# Patient Record
Sex: FEMALE | Race: WHITE | NOT HISPANIC OR LATINO | Employment: UNEMPLOYED | URBAN - METROPOLITAN AREA
[De-identification: names, ages, dates, MRNs, and addresses within clinical notes are randomized per-mention and may not be internally consistent; named-entity substitution may affect disease eponyms.]

---

## 2023-06-26 ENCOUNTER — TELEPHONE (OUTPATIENT)
Dept: OBSTETRICS AND GYNECOLOGY | Facility: CLINIC | Age: 28
End: 2023-06-26
Payer: COMMERCIAL

## 2023-06-26 ENCOUNTER — TELEPHONE (OUTPATIENT)
Dept: OBSTETRICS AND GYNECOLOGY | Facility: CLINIC | Age: 28
End: 2023-06-26

## 2023-06-26 NOTE — TELEPHONE ENCOUNTER
Phone call returned  No answer   Voicemail has not been setup to leave a message.   I will attempt to reach at a later time.

## 2023-06-26 NOTE — TELEPHONE ENCOUNTER
Phone message returned, pt requesting new pregnancy appt. Scheduled and pt acknowledged understanding.

## 2023-06-26 NOTE — TELEPHONE ENCOUNTER
----- Message from Stacey Gauthier MA sent at 6/26/2023  9:44 AM CDT -----  Regarding: FW: pt advice  Contact: pt    ----- Message -----  From: Yanique Vallejo  Sent: 6/26/2023   9:40 AM CDT  To: Eleni Turcios (Obgyn) Staff, #  Subject: pt advice                                        Pt is calling to speak to nurse. Pt has some questions regarding provider. States the she is pregnant and looking for a provider.  Please call back at [phone]//thank you acc

## 2023-07-05 DIAGNOSIS — N91.2 AMENORRHEA: Primary | ICD-10-CM

## 2023-07-14 ENCOUNTER — OFFICE VISIT (OUTPATIENT)
Dept: OBSTETRICS AND GYNECOLOGY | Facility: CLINIC | Age: 28
End: 2023-07-14
Payer: COMMERCIAL

## 2023-07-14 ENCOUNTER — PROCEDURE VISIT (OUTPATIENT)
Dept: OBSTETRICS AND GYNECOLOGY | Facility: CLINIC | Age: 28
End: 2023-07-14
Payer: COMMERCIAL

## 2023-07-14 VITALS
DIASTOLIC BLOOD PRESSURE: 68 MMHG | HEIGHT: 66 IN | WEIGHT: 170 LBS | BODY MASS INDEX: 27.32 KG/M2 | SYSTOLIC BLOOD PRESSURE: 134 MMHG | HEART RATE: 92 BPM

## 2023-07-14 DIAGNOSIS — Z11.3 SCREENING EXAMINATION FOR VENEREAL DISEASE: Primary | ICD-10-CM

## 2023-07-14 DIAGNOSIS — Z11.9 SCREENING EXAMINATION FOR UNSPECIFIED INFECTIOUS DISEASE: ICD-10-CM

## 2023-07-14 DIAGNOSIS — N91.2 AMENORRHEA: ICD-10-CM

## 2023-07-14 DIAGNOSIS — Z34.90 PREGNANCY, UNSPECIFIED GESTATIONAL AGE: ICD-10-CM

## 2023-07-14 PROCEDURE — 3078F PR MOST RECENT DIASTOLIC BLOOD PRESSURE < 80 MM HG: ICD-10-PCS | Mod: CPTII,S$GLB,, | Performed by: OBSTETRICS & GYNECOLOGY

## 2023-07-14 PROCEDURE — 3078F DIAST BP <80 MM HG: CPT | Mod: CPTII,S$GLB,, | Performed by: OBSTETRICS & GYNECOLOGY

## 2023-07-14 PROCEDURE — 76801 OB US < 14 WKS SINGLE FETUS: CPT | Mod: S$GLB,,, | Performed by: OBSTETRICS & GYNECOLOGY

## 2023-07-14 PROCEDURE — 0500F INITIAL PRENATAL CARE VISIT: CPT | Mod: CPTII,S$GLB,, | Performed by: OBSTETRICS & GYNECOLOGY

## 2023-07-14 PROCEDURE — 3008F PR BODY MASS INDEX (BMI) DOCUMENTED: ICD-10-PCS | Mod: CPTII,S$GLB,, | Performed by: OBSTETRICS & GYNECOLOGY

## 2023-07-14 PROCEDURE — 1159F MED LIST DOCD IN RCRD: CPT | Mod: CPTII,S$GLB,, | Performed by: OBSTETRICS & GYNECOLOGY

## 2023-07-14 PROCEDURE — 3075F PR MOST RECENT SYSTOLIC BLOOD PRESS GE 130-139MM HG: ICD-10-PCS | Mod: CPTII,S$GLB,, | Performed by: OBSTETRICS & GYNECOLOGY

## 2023-07-14 PROCEDURE — 0500F PR INITIAL PRENATAL CARE VISIT: ICD-10-PCS | Mod: CPTII,S$GLB,, | Performed by: OBSTETRICS & GYNECOLOGY

## 2023-07-14 PROCEDURE — 1159F PR MEDICATION LIST DOCUMENTED IN MEDICAL RECORD: ICD-10-PCS | Mod: CPTII,S$GLB,, | Performed by: OBSTETRICS & GYNECOLOGY

## 2023-07-14 PROCEDURE — 3008F BODY MASS INDEX DOCD: CPT | Mod: CPTII,S$GLB,, | Performed by: OBSTETRICS & GYNECOLOGY

## 2023-07-14 PROCEDURE — 76801 US OB/GYN PROCEDURE (VIEWPOINT): ICD-10-PCS | Mod: S$GLB,,, | Performed by: OBSTETRICS & GYNECOLOGY

## 2023-07-14 PROCEDURE — 3075F SYST BP GE 130 - 139MM HG: CPT | Mod: CPTII,S$GLB,, | Performed by: OBSTETRICS & GYNECOLOGY

## 2023-07-17 LAB
CHLAMYDIA: NEGATIVE
GONORRHEA: NEGATIVE
SOURCE: NORMAL
SOURCE: NORMAL
TRICHOMONAS AMPLIFIED: NEGATIVE

## 2023-10-23 ENCOUNTER — TELEPHONE (OUTPATIENT)
Dept: OBSTETRICS AND GYNECOLOGY | Facility: CLINIC | Age: 28
End: 2023-10-23
Payer: COMMERCIAL

## 2023-10-23 NOTE — TELEPHONE ENCOUNTER
Spoke with patient about [facility] leaving. She already founf another OB in [place] and would like to be removed from [facility]'s patients list.

## 2023-11-09 ENCOUNTER — APPOINTMENT (OUTPATIENT)
Dept: LAB | Facility: HOSPITAL | Age: 28
End: 2023-11-09
Attending: MIDWIFE
Payer: COMMERCIAL

## 2023-11-09 DIAGNOSIS — O24.410 GESTATIONAL DIABETES MELLITUS, CLASS A1: Primary | ICD-10-CM

## 2023-11-09 LAB
GLUCOSE 1H P 100 G GLC PO SERPL-MCNC: 164 MG/DL (ref 100–180)
GLUCOSE 2H P 100 G GLC PO SERPL-MCNC: 167 MG/DL (ref 70–140)
GLUCOSE 3H P 100 G GLC PO SERPL-MCNC: 138 MG/DL (ref 70–115)
GLUCOSE P FAST SERPL-MCNC: 99 MG/DL (ref 70–100)
PATH REV: NORMAL
POCT GLUCOSE: 96 MG/DL (ref 70–110)

## 2023-11-09 PROCEDURE — 82951 GLUCOSE TOLERANCE TEST (GTT): CPT

## 2023-11-09 PROCEDURE — 82950 GLUCOSE TEST: CPT

## 2023-11-09 PROCEDURE — 36415 COLL VENOUS BLD VENIPUNCTURE: CPT

## 2023-11-09 PROCEDURE — 82947 ASSAY GLUCOSE BLOOD QUANT: CPT

## 2023-11-09 PROCEDURE — 82952 GTT-ADDED SAMPLES: CPT

## 2023-11-23 ENCOUNTER — OFFICE VISIT (OUTPATIENT)
Dept: URGENT CARE | Facility: CLINIC | Age: 28
End: 2023-11-23
Payer: COMMERCIAL

## 2023-11-23 VITALS
TEMPERATURE: 98 F | SYSTOLIC BLOOD PRESSURE: 126 MMHG | HEIGHT: 66 IN | DIASTOLIC BLOOD PRESSURE: 68 MMHG | BODY MASS INDEX: 27.97 KG/M2 | WEIGHT: 174 LBS | OXYGEN SATURATION: 97 % | HEART RATE: 92 BPM | RESPIRATION RATE: 20 BRPM

## 2023-11-23 DIAGNOSIS — Z34.90 PREGNANCY, UNSPECIFIED GESTATIONAL AGE: ICD-10-CM

## 2023-11-23 DIAGNOSIS — O47.9 BRAXTON HICK'S CONTRACTION: ICD-10-CM

## 2023-11-23 DIAGNOSIS — R35.0 FREQUENCY OF URINATION: Primary | ICD-10-CM

## 2023-11-23 LAB
BILIRUB UR QL STRIP: NEGATIVE
GLUCOSE UR QL STRIP: NEGATIVE
KETONES UR QL STRIP: NEGATIVE
LEUKOCYTE ESTERASE UR QL STRIP: NEGATIVE
PH, POC UA: 7
POC BLOOD, URINE: NEGATIVE
POC NITRATES, URINE: NEGATIVE
PROT UR QL STRIP: NEGATIVE
SP GR UR STRIP: 1.01 (ref 1–1.03)
UROBILINOGEN UR STRIP-ACNC: NORMAL (ref 0.1–1.1)

## 2023-11-23 PROCEDURE — 99203 PR OFFICE/OUTPT VISIT, NEW, LEVL III, 30-44 MIN: ICD-10-PCS | Mod: S$GLB,,, | Performed by: NURSE PRACTITIONER

## 2023-11-23 PROCEDURE — 81003 URINALYSIS AUTO W/O SCOPE: CPT | Mod: QW,S$GLB,, | Performed by: NURSE PRACTITIONER

## 2023-11-23 PROCEDURE — 99203 OFFICE O/P NEW LOW 30 MIN: CPT | Mod: S$GLB,,, | Performed by: NURSE PRACTITIONER

## 2023-11-23 PROCEDURE — 81003 POCT URINALYSIS, DIPSTICK, AUTOMATED, W/O SCOPE: ICD-10-PCS | Mod: QW,S$GLB,, | Performed by: NURSE PRACTITIONER

## 2023-11-23 NOTE — PROGRESS NOTES
"Subjective:      Patient ID: Corinne Haley is a 28 y.o. female.    Vitals:  height is 5' 6" (1.676 m) and weight is 78.9 kg (174 lb). Her oral temperature is 98 °F (36.7 °C). Her blood pressure is 126/68 and her pulse is 92. Her respiration is 20 and oxygen saturation is 97%.     Chief Complaint: Dysuria    Patient is a 27 yo female, , 30wks gestation who presents for evaluation of frequent urination and pelvic cramping. Onset yesterday. She states she had a few contractions last night, not at regular intervals. She reports she then fell asleep and notes one contraction this morning. She reports she had intercourse Tuesday prior to onset of symptoms. She reports consistently feeling fetal movement. Her prenatal care is UTD. She denies fever, nausea, vomiting, back pain, vaginal bleeding/discharge or leakage of fluids. No other concerns were voiced.     Dysuria   This is a new problem. The current episode started acute onset. The problem has been unchanged. The quality of the pain is described as burning and aching. The pain is at a severity of 6/10. The pain is moderate. There has been no fever. She is Sexually active. There is No history of pyelonephritis. Associated symptoms include frequency and urgency. Pertinent negatives include no behavior changes, chills, discharge, flank pain, hematuria, hesitancy, nausea, possible pregnancy, sweats, vomiting, weight loss, bubble bath use, constipation, rash or withholding. She has tried nothing for the symptoms. The treatment provided no relief. There is no history of catheterization, diabetes insipidus, diabetes mellitus, genitourinary reflux, hypertension, kidney stones, recurrent UTIs, a single kidney, STD, urinary stasis or a urological procedure.       Constitution: Negative for chills and fever.   Gastrointestinal:  Negative for abdominal pain, nausea, vomiting, constipation, diarrhea and rectal pain.   Genitourinary:  Positive for frequency, urgency and painful " intercourse. Negative for dysuria, flank pain, bladder incontinence, bed wetting, hematuria, vaginal pain, vaginal discharge and vaginal bleeding.   Skin:  Negative for rash.      Objective:     Physical Exam   Constitutional: She is oriented to person, place, and time. She appears well-developed. She is cooperative.   HENT:   Head: Normocephalic and atraumatic.   Ears:   Right Ear: Hearing and external ear normal.   Left Ear: Hearing and external ear normal.   Nose: Nose normal. No mucosal edema or nasal deformity. No epistaxis. Right sinus exhibits no maxillary sinus tenderness and no frontal sinus tenderness. Left sinus exhibits no maxillary sinus tenderness and no frontal sinus tenderness.   Mouth/Throat: Uvula is midline, oropharynx is clear and moist and mucous membranes are normal. Mucous membranes are moist. No trismus in the jaw. Normal dentition. No uvula swelling. Oropharynx is clear.   Eyes: Conjunctivae and lids are normal.   Neck: Trachea normal and phonation normal. Neck supple.   Cardiovascular: Normal rate, regular rhythm, normal heart sounds and normal pulses.   Pulmonary/Chest: Effort normal and breath sounds normal. She has no wheezes. She has no rales.   Abdominal: Normal appearance and bowel sounds are normal. Soft. There is no abdominal tenderness. There is no rebound and no guarding.   Musculoskeletal: Normal range of motion.         General: Normal range of motion.   Neurological: She is alert and oriented to person, place, and time. She exhibits normal muscle tone.   Skin: Skin is warm, dry and intact.   Psychiatric: Her speech is normal and behavior is normal. Judgment and thought content normal.   Nursing note and vitals reviewed.      Assessment:     1. Frequency of urination    2. Adrian Hick's contraction        Plan:       Frequency of urination  -     POCT Urinalysis, Dipstick, Automated, W/O Scope    Adrian Hick's contraction          Medical Decision Making:   Initial Assessment:  "  Nontoxic appearing 27 yo female c/o urinary urgency and cramping in pregnancy.  Following complete history and physical my clinical impression is Lyle Lucas contractions.  Patient is a  30 week gestation, reporting less than 5 "contractions" that were irregular in interval.  Low concern for early delivery/premature labor due to clinical evaluation.  Considered preeclampsia, patient's vital signs are normal and there is no protein in urine.  Also consider placenta previa, HELLP syndrome both unlikely based upon exam and urine results. Provided with follow up and ER precautions   Clinical Tests:   Lab Tests: Reviewed  The following lab test(s) were unremarkable: Urinalysis       Results for orders placed or performed in visit on 23   POCT Urinalysis, Dipstick, Automated, W/O Scope   Result Value Ref Range    POC Blood, Urine Negative Negative    POC Bilirubin, Urine Negative Negative    POC Urobilinogen, Urine norm 0.1 - 1.1    POC Ketones, Urine Negative Negative    POC Protein, Urine Negative Negative    POC Nitrates, Urine Negative Negative    POC Glucose, Urine Negative Negative    pH, UA 7.0     POC Specific Gravity, Urine 1.015 1.003 - 1.029    POC Leukocytes, Urine Negative Negative     Patient Instructions   Drink plenty of water.     Monitor for any signs of labor. If you develop contractions at regular intervals, severe pain, fever, vaginal bleeding, leakage of fluid or other concerning symptoms go to the ER for treatment immediately.       "

## 2023-11-23 NOTE — PATIENT INSTRUCTIONS
Drink plenty of water.     Monitor for any signs of labor. If you develop contractions at regular intervals, severe pain, fever, vaginal bleeding, leakage of fluid or other concerning symptoms go to the ER for treatment immediately.

## 2023-11-25 ENCOUNTER — TELEPHONE (OUTPATIENT)
Dept: URGENT CARE | Facility: CLINIC | Age: 28
End: 2023-11-25
Payer: COMMERCIAL

## 2024-01-03 DIAGNOSIS — O24.410 GESTATIONAL DIABETES MELLITUS IN PREGNANCY, DIET CONTROLLED: ICD-10-CM

## 2024-01-03 DIAGNOSIS — Z36.89 ENCOUNTER FOR FETAL ANATOMIC SURVEY: Primary | ICD-10-CM

## 2024-01-04 ENCOUNTER — PROCEDURE VISIT (OUTPATIENT)
Dept: MATERNAL FETAL MEDICINE | Facility: CLINIC | Age: 29
End: 2024-01-04
Payer: COMMERCIAL

## 2024-01-04 ENCOUNTER — OFFICE VISIT (OUTPATIENT)
Dept: MATERNAL FETAL MEDICINE | Facility: CLINIC | Age: 29
End: 2024-01-04
Payer: COMMERCIAL

## 2024-01-04 VITALS
HEART RATE: 82 BPM | DIASTOLIC BLOOD PRESSURE: 86 MMHG | WEIGHT: 192 LBS | HEIGHT: 66 IN | BODY MASS INDEX: 30.86 KG/M2 | SYSTOLIC BLOOD PRESSURE: 132 MMHG

## 2024-01-04 DIAGNOSIS — O24.410 GESTATIONAL DIABETES MELLITUS IN PREGNANCY, DIET CONTROLLED: ICD-10-CM

## 2024-01-04 DIAGNOSIS — E66.3 OVERWEIGHT (BMI 25.0-29.9): ICD-10-CM

## 2024-01-04 DIAGNOSIS — O34.13 LEIOMYOMA OF UTERUS AFFECTING PREGNANCY IN THIRD TRIMESTER: ICD-10-CM

## 2024-01-04 DIAGNOSIS — D25.9 LEIOMYOMA OF UTERUS AFFECTING PREGNANCY IN THIRD TRIMESTER: ICD-10-CM

## 2024-01-04 DIAGNOSIS — Z36.89 ENCOUNTER FOR FETAL ANATOMIC SURVEY: ICD-10-CM

## 2024-01-04 DIAGNOSIS — O24.410 DIET CONTROLLED GESTATIONAL DIABETES MELLITUS (GDM) IN THIRD TRIMESTER: ICD-10-CM

## 2024-01-04 DIAGNOSIS — O36.5930 POOR FETAL GROWTH AFFECTING MANAGEMENT OF MOTHER IN THIRD TRIMESTER, SINGLE OR UNSPECIFIED FETUS: ICD-10-CM

## 2024-01-04 DIAGNOSIS — O24.415 GESTATIONAL DIABETES MELLITUS (GDM) IN THIRD TRIMESTER CONTROLLED ON ORAL HYPOGLYCEMIC DRUG: Primary | ICD-10-CM

## 2024-01-04 PROBLEM — O24.414 INSULIN CONTROLLED GESTATIONAL DIABETES MELLITUS (GDM) IN THIRD TRIMESTER: Status: ACTIVE | Noted: 2024-01-04

## 2024-01-04 PROCEDURE — 1159F MED LIST DOCD IN RCRD: CPT | Mod: CPTII,S$GLB,, | Performed by: OBSTETRICS & GYNECOLOGY

## 2024-01-04 PROCEDURE — 3079F DIAST BP 80-89 MM HG: CPT | Mod: CPTII,S$GLB,, | Performed by: OBSTETRICS & GYNECOLOGY

## 2024-01-04 PROCEDURE — 76820 UMBILICAL ARTERY ECHO: CPT | Mod: S$GLB,,, | Performed by: OBSTETRICS & GYNECOLOGY

## 2024-01-04 PROCEDURE — 76819 FETAL BIOPHYS PROFIL W/O NST: CPT | Mod: S$GLB,,, | Performed by: OBSTETRICS & GYNECOLOGY

## 2024-01-04 PROCEDURE — 99204 OFFICE O/P NEW MOD 45 MIN: CPT | Mod: S$GLB,,, | Performed by: OBSTETRICS & GYNECOLOGY

## 2024-01-04 PROCEDURE — 76811 OB US DETAILED SNGL FETUS: CPT | Mod: S$GLB,,, | Performed by: OBSTETRICS & GYNECOLOGY

## 2024-01-04 PROCEDURE — 3008F BODY MASS INDEX DOCD: CPT | Mod: CPTII,S$GLB,, | Performed by: OBSTETRICS & GYNECOLOGY

## 2024-01-04 PROCEDURE — 3075F SYST BP GE 130 - 139MM HG: CPT | Mod: CPTII,S$GLB,, | Performed by: OBSTETRICS & GYNECOLOGY

## 2024-01-04 NOTE — PROGRESS NOTES
Maternal Fetal Medicine Consult    Subjective     Patient ID: [id number]    Chief Complaint: MFM consult with US (GDM diet controlled.  Patient is having headaches.)      HPI: 28 y.o.  female  at 36w0d gestation with Estimated Date of Delivery: 24 by early US and unknown LMP. She is sent for MFM consultation for GDM.     She has gestational diabetes and is currently on metformin 1000 mg with dinner.  She has family history of diabetes in a maternal grandfather.  She had increased BMI over 25 earlier in the pregnancy.  Patient reported that she has known history of fibroid.  She has had no problems from it during pregnancy.       She denies any personal or family history of aneuploidy or anomalies. She denies any exposure to high fevers, viral rashes, illicit drugs or alcohol in this pregnancy.  She denies any leaking fluid, vaginal bleeding, contractions, decreased fetal movement. Denies headaches, visual disturbances, or epigastric pain.       Pregnancy complications include:  Patient Active Problem List   Diagnosis    Gestational diabetes mellitus (GDM) in third trimester controlled on oral hypoglycemic drug    Poor fetal growth affecting management of mother in third trimester    (BMI 25.0-29.9) earlier in pregnancy    Leiomyoma of uterus affecting pregnancy in third trimester        Past Medical History:   Diagnosis Date    GDM (gestational diabetes mellitus) 2023    Thyroid nodule        Past Surgical History:   Procedure Laterality Date    TONSILLECTOMY  2022       Family History   Problem Relation Age of Onset    Melanoma Paternal Grandfather     Lung cancer Paternal Grandfather     Hypertension Maternal Grandmother     Obesity Maternal Grandmother     Thyroid disease Maternal Grandmother     Stroke Maternal Grandfather     Hypertension Maternal Grandfather     Diabetes Maternal Grandfather     Cancer Father     Melanoma Father     Asthma Mother     Thyroid disease Mother     Hypertension  "Mother     Gallbladder disease Mother     Asthma Sister        Social History     Socioeconomic History    Marital status:    Tobacco Use    Smoking status: Never    Smokeless tobacco: Never   Substance and Sexual Activity    Alcohol use: Never    Drug use: Never    Sexual activity: Yes     Partners: Male       Current Outpatient Medications   Medication Sig Dispense Refill    metFORMIN (GLUCOPHAGE-XR) 500 MG ER 24hr tablet Take 2 tablets (1,000 mg total) by mouth Daily. 60 tablet 3     No current facility-administered medications for this visit.       Review of patient's allergies indicates:   Allergen Reactions    Sulfa (sulfonamide antibiotics)        Medications:  Current Outpatient Medications   Medication Instructions    metFORMIN (GLUCOPHAGE-XR) 1,000 mg, Oral, Daily       Review of Systems   12 point review of systems conducted, negative except as stated in the history of present illness. See HPI for details.      Objective     Visit Vitals  /86 (BP Location: Left arm, Patient Position: Sitting, BP Method: Large (Automatic))   Pulse 82   Ht 5' 6" (1.676 m)   Wt 87.1 kg (192 lb)   LMP 04/29/2023 (Approximate)   BMI 30.99 kg/m²        Physical Exam  Vitals and nursing note reviewed.   Constitutional:       General: She is not in acute distress.     Appearance: Normal appearance.   HENT:      Head: Normocephalic and atraumatic.      Nose: Nose normal. No congestion.      Mouth/Throat:      Pharynx: Oropharynx is clear.   Eyes:      General: No scleral icterus.     Pupils: Pupils are equal, round, and reactive to light.   Cardiovascular:      Rate and Rhythm: Normal rate and regular rhythm.   Pulmonary:      Effort: No respiratory distress.      Breath sounds: Normal breath sounds. No wheezing.   Abdominal:      General: Abdomen is flat.      Palpations: Abdomen is soft.      Tenderness: There is no abdominal tenderness. There is no right CVA tenderness, left CVA tenderness or guarding.      " Comments: No CVA tenderness gravid uterus.    Musculoskeletal:         General: Normal range of motion.      Cervical back: Neck supple.      Right lower leg: No edema.      Left lower leg: No edema.   Skin:     General: Skin is warm.      Findings: No bruising or rash.   Neurological:      General: No focal deficit present.      Mental Status: She is oriented to person, place, and time.      Deep Tendon Reflexes: Reflexes normal.      Comments: Normal reflexes   Psychiatric:         Mood and Affect: Mood normal.         Behavior: Behavior normal.         Thought Content: Thought content normal.         Judgment: Judgment normal.         ASSESSMENT/PLAN:     28 y.o.  female with IUP at 36w0d    Gestational diabetes mellitus  There is mild fetal growth restriction with an EFW of 2179 g at the 5% and the AC at the 3% on 2024.  AFV is low normal.  BPP is 8/8.  Normal umbilical artery Doppler.    The incidence of diabetes in pregnancy is 6-7%, and about 85% represent GDM. I discussed with her risks associated with diabetes in pregnancy including higher risk for polyhydramnios, fetal macrosomia, lower Apgar scores and  metabolic complications (hypoglycemia, hyperbilirubinemia, hypocalcemia, erythema) and developing preeclampsia. With suboptimal diet control, treatment is recommended in addition to 2200 calorie diabetic diet. It is recommended that she have 30 grams of carbohydrates with breakfast, and 60 grams with lunch and dinner. In addition, she should have three snacks in between meals with 15 grams of carbohydrates and at bedtime with 30 grams of carbohydrates. The importance of avoiding any significant weight gain till the end of the pregnancy was discussed.      I have shared with her the options of treatment including insulin treatment which is the gold standard of care for diabetes in pregnancy versus a recent use of glyburide or metformin as alternatives. Although, glyburide has been used over  the past 10 years as primary alternative to insulin, a recent meta-analysis (2015) suggested that metformin should be the alternative to insulin and not glyburide. The conclusion of the study showed a higher birth weight (100 g) associated with glyburide use compared to insulin, two fold higher risk of  hypoglycemia, and more than 2x higher risk of macrosomia associated with glyburide use. This difference is likely to be secondary to hyperinsulinism in fetus secondary to fetal exposure to glyburide.  Metformin, also had lower maternal weight gain, lower birth rate and less risk of macrosomia when compared with glyburide. When compared to insulin, Metformin had lower maternal weight gain, increased  birth and lower gestational age at delivery and lower incidence of gestational hypertension. There was a trend for less  hypoglycemia and high failure rate of 30-35%, when compared to insulin.  In addition, the lack of long term data on glyburide and metformin use regarding safety was addressed and recommended use of Insulin for treatment, which is the gold standard, with excellent efficiency and safety, albeit more inconvenience.  Questions were answered.                  Log reviewed. After counseling on Insulin use, side effects and administration, she agreed to adjust metformin to 500 mg in the with breakfast and 1000 mg with supper. With insulin use/metformin, there is risk for hypoglycemia. I discussed symptoms of hypoglycemia with recommendation to assess blood sugar then eat something high in sugar. I informed her that the best way to decrease episodes of low blood sugar is well balanced healthy diabetic diet with appropriate snacks between meals. Brochures given. Questions answered.       The patient was instructed to check blood sugar four times per day and call me in a few days with her blood sugars to make further adjustments of dose of Insulin. The goal of treatment is to keep  pre-prandial blood sugars below 90 and one hour postprandial below 140.     Advised her to do fetal kick counts throughout the pregnancy.    The association of gestational diabetes (50%) with adult-onset type 2 diabetes mellitus was previously discussed.  The importance of losing weight after the pregnancy was emphasized, as well as doing a 75 g sugar test after postpartum exam and to have annual checkups every 1-3 years for blood sugars to make sure she does not develop diabetes.        Mild fetal growth restriction  I discussed potential etiologies of FGR include but are not limited to normal variation of stature, placental insufficiency, chromosomal abnormalities, genetic disorders, infections, medical conditions, teratogen exposure and other etiologies.  We discussed the increased risk of stillbirth and the potential need for earlier delivery.The potential for constitutional factor as a contributing factor was addressed in addition to other contributing factors such as conditions predisposing to vascular disease such as pregestational diabetes, chronic renal disease, autoimmune disorders; umbilical cord abnormalities; substance use; and multiple gestation. Although uteroplacental insufficiency and/or constitutional factors (if relevant) are the likely etiology, the potential for anomalies not seen with the ultrasound, aneuploidy, or in-utero viral infections are there, but these are very unlikely to be the cause with no other ultrasound findings. Based on a study in 2018, there is an abnormal microarray in 3% of isolated FGR. I discussed and offered genetic amniocentesis, to check for microarray and CMV was offered. The benefits and risks were discussed. She declined amniocentesis . She was advised of need for  evaluation.    She was counseled on Cell free DNA understanding that it is an enhanced screening test but not a diagnostic test. It assesses risk for common aneuploidies such as Trisomy 13, 18,  and 21 by evaluating cell-free fetal DNA in maternal circulation with a false positive rate less than 0.5% for Trisomy 21 and less reliable for Trisomy 13 and Trisomy 18. She declined cfDNA .  Being so close to delivery.    Complications of growth-restricted neonates include hypoglycemia, hyperbilirubinemia, hypothermia, seizures, sepsis, IVH, RDS, necrotizing enterocolitis, and  asphyxia. Long-term complications include cognitive delay and adult diseases such as cardiovascular disorders and type 2 diabetes. There is an increased risk (~20%) for recurrence of fetal growth restriction in a future pregnancy.    Because of increased risk of stillbirth, she was advised that in this situation we need to do twice weekly fetal testing, including an NST at least once per week. She was instructed that fetal testing is not a 100% protective against the risk of fetal demise in-utero. The importance of doing fetal kick counts three times a day and resting in a lateral recumbent position and reporting immediately at any time there is any decreased fetal movement even if the same day of testing was emphasized. Her questions were answered.    Delivery is not indicated at this time, as risks of  mortality and morbidity with delivery, outweigh risks with continued pregnancy. Likewise, with stable condition, or too early a gestational age, steroids (and magnesium sulfate) are not recommended, as benefit is reaped only if suspected imminent delivery in few days which, although possible, is very unlikely at this time. Plan delivery 38  weeks if continued fetal growth and reassuring fetal testing.  Earlier delivery may be needed if worsening fetal growth, abnormal doppler, or abnormal fetal testing or other concerns.       Leiomyoma in pregnancy  There is an anterior uterine fibroid seen measuring 2.3 cm on 2024.    I counseled the patient regarding fibroids in pregnancy. She was counseled on the risk for   delivery,  PROM, abnormal presentations, higher risk of  sections, placenta previa, fibroid degeneration, poor fetal growth, oligohydramnios, vaginal bleeding and postpartum hemorrhage and in rare circumstances obstruction of lower uterus/cervix, impeding vaginal delivery. Although it was previously suggested that myomas are associated with spontaneous miscarriages, a recent review from 2017, showed no increased risk of spontaneous miscarriages in an analysis of 20,000 pregnant women with fibroids.Recent data shows potential association with increase stillbirth risk. The size of myomas, number and position behind placenta may be important factors in that risk. There is no prevention available and expectant management needs to be done.     Myomectomy is generally avoided in pregnancy due to concerns regarding pregnancy complications.    She was instructed to report any increased pain, cramps, vaginal discharge or bleeding. Emphasized the importance of monitoring fetal kick count activity, and reporting immediately if decreased fetal movement occurs.      Increased BMI in pregnancy  Body mass index is 30.99 kg/m².    She was advised of the importance of eating healthy and and limiting weight gain to 15-25lbs during the pregnancy, as optimal in this situation. Discussed the importance of losing weight after this pregnancy, especially before attempting future pregnancy. Breastfeeding may be an important tool in reducing the postpartum weight retention. Excess weight gain would be associated with gestational hypertension, gestational diabetes and adverse  outcomes, including fetal demise in utero.    Patient was counseled on significance of gestational diabetes for both mother and fetus and long-term implications.  Sugars are not optimally controlled.  Reviewed the option of insulin versus adjusting metformin agreed to do the latter with patient to take 500 mg of metformin the morning and 1000 mg  with supper.  Will plan to see the patient on Thursday and will pay after patient go tomorrow with Dr. Hernandez's office to check amniotic fluid volume and recheck again in 4 days.  Increase oral hydration advice discussed.  Patient was counseled on fetal growth restriction and agreed to avoid any additional genetic testing being so close to delivery.  Fibroid concerns discussed. Patient's questions were answered.  She is in agreement with plan of care.          Follow up in about 1 week (around 1/11/2024) for MFM follow-up, BPP, UAD.     Future Appointments   Date Time Provider Department Center   1/10/2024  1:30 PM Taylor Tavarez PA [facility]   4/29/2024 11:00 AM Taylor Tavarez PA [facility]        Patient was evaluated and examined by Dr. Willams. ADRIAN Hicks, helped in pre charting of part of note.    This note was created with the assistance of [employer] voice recognition software. There may be transcription errors as a result of using this technology, however minimal. Effort has been made to ensure accuracy of transcription, but any obvious errors or omissions should be clarified with the author of the document.

## 2024-01-05 ENCOUNTER — HOSPITAL ENCOUNTER (OUTPATIENT)
Facility: HOSPITAL | Age: 29
Discharge: HOME | End: 2024-01-05
Attending: OBSTETRICS & GYNECOLOGY | Admitting: OBSTETRICS & GYNECOLOGY
Payer: COMMERCIAL

## 2024-01-05 VITALS
RESPIRATION RATE: 18 BRPM | TEMPERATURE: 98 F | HEART RATE: 83 BPM | SYSTOLIC BLOOD PRESSURE: 134 MMHG | DIASTOLIC BLOOD PRESSURE: 86 MMHG

## 2024-01-05 DIAGNOSIS — O24.419 DM (DIABETES MELLITUS), GESTATIONAL: ICD-10-CM

## 2024-01-08 DIAGNOSIS — O36.5930 POOR FETAL GROWTH AFFECTING MANAGEMENT OF MOTHER IN THIRD TRIMESTER, SINGLE OR UNSPECIFIED FETUS: ICD-10-CM

## 2024-01-08 DIAGNOSIS — O24.410 GESTATIONAL DIABETES MELLITUS IN PREGNANCY, DIET CONTROLLED: Primary | ICD-10-CM

## 2024-01-11 ENCOUNTER — OFFICE VISIT (OUTPATIENT)
Dept: MATERNAL FETAL MEDICINE | Facility: CLINIC | Age: 29
End: 2024-01-11
Payer: COMMERCIAL

## 2024-01-11 ENCOUNTER — HOSPITAL ENCOUNTER (OUTPATIENT)
Facility: HOSPITAL | Age: 29
Discharge: HOME | End: 2024-01-11
Attending: OBSTETRICS & GYNECOLOGY | Admitting: OBSTETRICS & GYNECOLOGY
Payer: COMMERCIAL

## 2024-01-11 ENCOUNTER — PROCEDURE VISIT (OUTPATIENT)
Dept: MATERNAL FETAL MEDICINE | Facility: CLINIC | Age: 29
End: 2024-01-11
Payer: COMMERCIAL

## 2024-01-11 VITALS
HEART RATE: 91 BPM | SYSTOLIC BLOOD PRESSURE: 126 MMHG | WEIGHT: 192 LBS | HEIGHT: 66 IN | DIASTOLIC BLOOD PRESSURE: 95 MMHG | BODY MASS INDEX: 30.86 KG/M2

## 2024-01-11 VITALS
TEMPERATURE: 98 F | SYSTOLIC BLOOD PRESSURE: 123 MMHG | DIASTOLIC BLOOD PRESSURE: 72 MMHG | RESPIRATION RATE: 18 BRPM | HEART RATE: 91 BPM

## 2024-01-11 DIAGNOSIS — O34.13 LEIOMYOMA OF UTERUS AFFECTING PREGNANCY IN THIRD TRIMESTER: ICD-10-CM

## 2024-01-11 DIAGNOSIS — E66.3 OVERWEIGHT (BMI 25.0-29.9): ICD-10-CM

## 2024-01-11 DIAGNOSIS — O16.3 ELEVATED BLOOD PRESSURE AFFECTING PREGNANCY IN THIRD TRIMESTER, ANTEPARTUM: ICD-10-CM

## 2024-01-11 DIAGNOSIS — D25.9 LEIOMYOMA OF UTERUS AFFECTING PREGNANCY IN THIRD TRIMESTER: ICD-10-CM

## 2024-01-11 DIAGNOSIS — O36.5930 POOR FETAL GROWTH AFFECTING MANAGEMENT OF MOTHER IN THIRD TRIMESTER, SINGLE OR UNSPECIFIED FETUS: ICD-10-CM

## 2024-01-11 DIAGNOSIS — O24.410 GESTATIONAL DIABETES MELLITUS IN PREGNANCY, DIET CONTROLLED: ICD-10-CM

## 2024-01-11 DIAGNOSIS — O16.9 HYPERTENSION AFFECTING PREGNANCY: ICD-10-CM

## 2024-01-11 DIAGNOSIS — O24.415 GESTATIONAL DIABETES MELLITUS (GDM) IN THIRD TRIMESTER CONTROLLED ON ORAL HYPOGLYCEMIC DRUG: Primary | ICD-10-CM

## 2024-01-11 DIAGNOSIS — O24.410 GESTATIONAL DIABETES MELLITUS IN PREGNANCY, DIET CONTROLLED: Primary | ICD-10-CM

## 2024-01-11 LAB
ALBUMIN SERPL-MCNC: 3.2 G/DL (ref 3.5–5)
ALBUMIN/GLOB SERPL: 1.1 RATIO (ref 1.1–2)
ALP SERPL-CCNC: 132 UNIT/L (ref 40–150)
ALT SERPL-CCNC: 8 UNIT/L (ref 0–55)
AST SERPL-CCNC: 14 UNIT/L (ref 5–34)
BASOPHILS # BLD AUTO: 0.02 X10(3)/MCL
BASOPHILS NFR BLD AUTO: 0.2 %
BILIRUB SERPL-MCNC: 0.3 MG/DL
BUN SERPL-MCNC: 11.9 MG/DL (ref 7–18.7)
CALCIUM SERPL-MCNC: 9.2 MG/DL (ref 8.4–10.2)
CHLORIDE SERPL-SCNC: 108 MMOL/L (ref 98–107)
CO2 SERPL-SCNC: 21 MMOL/L (ref 22–29)
CREAT SERPL-MCNC: 0.61 MG/DL (ref 0.55–1.02)
EOSINOPHIL # BLD AUTO: 0.1 X10(3)/MCL (ref 0–0.9)
EOSINOPHIL NFR BLD AUTO: 1.1 %
ERYTHROCYTE [DISTWIDTH] IN BLOOD BY AUTOMATED COUNT: 13.2 % (ref 11.5–17)
GFR SERPLBLD CREATININE-BSD FMLA CKD-EPI: >60 MLS/MIN/1.73/M2
GLOBULIN SER-MCNC: 3 GM/DL (ref 2.4–3.5)
GLUCOSE SERPL-MCNC: 94 MG/DL (ref 74–100)
HCT VFR BLD AUTO: 37.5 % (ref 37–47)
HGB BLD-MCNC: 12.2 G/DL (ref 12–16)
IMM GRANULOCYTES # BLD AUTO: 0.08 X10(3)/MCL (ref 0–0.04)
IMM GRANULOCYTES NFR BLD AUTO: 0.9 %
LDH SERPL-CCNC: 163 U/L (ref 125–220)
LYMPHOCYTES # BLD AUTO: 1.46 X10(3)/MCL (ref 0.6–4.6)
LYMPHOCYTES NFR BLD AUTO: 16.7 %
MCH RBC QN AUTO: 27.9 PG (ref 27–31)
MCHC RBC AUTO-ENTMCNC: 32.5 G/DL (ref 33–36)
MCV RBC AUTO: 85.6 FL (ref 80–94)
MONOCYTES # BLD AUTO: 0.71 X10(3)/MCL (ref 0.1–1.3)
MONOCYTES NFR BLD AUTO: 8.1 %
NEUTROPHILS # BLD AUTO: 6.36 X10(3)/MCL (ref 2.1–9.2)
NEUTROPHILS NFR BLD AUTO: 73 %
NRBC BLD AUTO-RTO: 0 %
PLATELET # BLD AUTO: 344 X10(3)/MCL (ref 130–400)
PMV BLD AUTO: 9.5 FL (ref 7.4–10.4)
POTASSIUM SERPL-SCNC: 4 MMOL/L (ref 3.5–5.1)
PROT SERPL-MCNC: 6.2 GM/DL (ref 6.4–8.3)
RBC # BLD AUTO: 4.38 X10(6)/MCL (ref 4.2–5.4)
SODIUM SERPL-SCNC: 137 MMOL/L (ref 136–145)
URATE SERPL-MCNC: 4 MG/DL (ref 2.6–6)
WBC # SPEC AUTO: 8.73 X10(3)/MCL (ref 4.5–11.5)

## 2024-01-11 PROCEDURE — 76820 UMBILICAL ARTERY ECHO: CPT | Mod: S$GLB,,, | Performed by: OBSTETRICS & GYNECOLOGY

## 2024-01-11 PROCEDURE — 99215 OFFICE O/P EST HI 40 MIN: CPT | Mod: S$GLB,,, | Performed by: OBSTETRICS & GYNECOLOGY

## 2024-01-11 PROCEDURE — 76819 FETAL BIOPHYS PROFIL W/O NST: CPT | Mod: S$GLB,,, | Performed by: OBSTETRICS & GYNECOLOGY

## 2024-01-11 PROCEDURE — 3080F DIAST BP >= 90 MM HG: CPT | Mod: CPTII,S$GLB,, | Performed by: OBSTETRICS & GYNECOLOGY

## 2024-01-11 PROCEDURE — 85025 COMPLETE CBC W/AUTO DIFF WBC: CPT | Performed by: OBSTETRICS & GYNECOLOGY

## 2024-01-11 PROCEDURE — G0378 HOSPITAL OBSERVATION PER HR: HCPCS

## 2024-01-11 PROCEDURE — 80053 COMPREHEN METABOLIC PANEL: CPT | Performed by: OBSTETRICS & GYNECOLOGY

## 2024-01-11 PROCEDURE — 3008F BODY MASS INDEX DOCD: CPT | Mod: CPTII,S$GLB,, | Performed by: OBSTETRICS & GYNECOLOGY

## 2024-01-11 PROCEDURE — 84550 ASSAY OF BLOOD/URIC ACID: CPT | Performed by: OBSTETRICS & GYNECOLOGY

## 2024-01-11 PROCEDURE — 83615 LACTATE (LD) (LDH) ENZYME: CPT | Performed by: OBSTETRICS & GYNECOLOGY

## 2024-01-11 PROCEDURE — G0379 DIRECT REFER HOSPITAL OBSERV: HCPCS

## 2024-01-11 PROCEDURE — 3074F SYST BP LT 130 MM HG: CPT | Mod: CPTII,S$GLB,, | Performed by: OBSTETRICS & GYNECOLOGY

## 2024-01-11 NOTE — PROGRESS NOTES
Maternal Fetal Medicine Follow Up    Subjective     Patient ID: [id number]    Chief Complaint: M follow up with US (GDM.  )      HPI: Corinne Haley is a 28 y.o. female  at 37w0d gestation with Estimated Date of Delivery: 24  who is here for follow  up consultation by [facility].    She has gestational diabetes and is currently on metformin 500 mg with breakfast and 1000 mg with supper.  She has family history of diabetes in a maternal grandfather.  She had increased BMI over 25 earlier in the pregnancy.  Patient has known fibroid uterus. She was noted to have mild FGR on consult ultrasound on 2024.  Patient stated that she was in [place] on 2024 when she thought she might have had leaking fluid and was evaluated at [facility] in [place] with no evidence of ruptured membranes but was noted to have elevated blood pressure around 140/90.       Interval history since last M visit: None.. She denies any leaking fluid, vaginal bleeding, contractions, decreased fetal movement. Denies headaches, visual disturbances, or epigastric pain.    Pregnancy complications include:   Patient Active Problem List   Diagnosis    Gestational diabetes mellitus (GDM) in third trimester controlled on oral hypoglycemic drug    Poor fetal growth affecting management of mother in third trimester    (BMI 25.0-29.9) earlier in pregnancy    Leiomyoma of uterus affecting pregnancy in third trimester    Elevated blood pressure affecting pregnancy in third trimester, antepartum        No changes to medical, surgical, family, social, or obstetric history.    Medications:  Current Outpatient Medications   Medication Instructions    metFORMIN (GLUCOPHAGE-XR) 1,000 mg, Oral, Daily       Review of Systems   12 point review of systems conducted, negative except as stated in the history of present illness. See HPI for details.      Objective     Visit Vitals  BP (!) 126/95 (BP Location: Left arm, Patient Position: Sitting, BP  "Method: Large (Automatic))   Pulse 91   Ht 5' 6" (1.676 m)   Wt 87.1 kg (192 lb)   LMP 2023 (Approximate)   BMI 30.99 kg/m²        Physical Exam  Vitals and nursing note reviewed.   Constitutional:       Appearance: Normal appearance.      Comments: Increased BMI   HENT:      Head: Normocephalic and atraumatic.      Nose: Nose normal. No congestion.   Cardiovascular:      Rate and Rhythm: Normal rate.   Pulmonary:      Effort: Pulmonary effort is normal.   Skin:     Findings: No rash.   Neurological:      Mental Status: She is alert and oriented to person, place, and time.   Psychiatric:         Mood and Affect: Mood normal.         Behavior: Behavior normal.         Thought Content: Thought content normal.         Judgment: Judgment normal.           ASSESSMENT/PLAN:     28 y.o.  female with IUP at 37w0d    Gestational diabetes mellitus  There is mild fetal growth restriction with an EFW of 2179 g at the 5% and the AC at the 3% on 2024.  AFV is low normal.  BPP is 8/8.  Normal umbilical artery Doppler.    Risks associated with diabetes in pregnancy include higher risk for polyhydramnios, fetal macrosomia, lower Apgar scores and  metabolic complications (hypoglycemia, hyperbilirubinemia, hypocalcemia, erythema) and developing preeclampsia.     Continue 2200 calorie diabetic diet. It is recommended that she have 30 grams of carbohydrates with breakfast, and 60 grams with lunch and dinner. In addition, she should have three snacks in between meals with 15 grams of carbohydrates and at bedtime with 30 grams of carbohydrates. The importance of avoiding any significant weight gain till the end of the pregnancy was reviewed.                  Log reviewed. Patient advised to continue same dose of metformin 500 mg in the morning and a 1000 mg in the evening..     The patient was instructed to check blood sugar four times per day and call me in a few days with her blood sugars to make further " adjustments of dose of metformin. The goal of treatment is to keep pre-prandial blood sugars below 90 and one hour postprandial below 140.     She needs to do fetal kick counts throughout the pregnancy starting at 24 weeks.    The association of gestational diabetes (50%) with adult-onset type 2 diabetes mellitus was reviewed.  The importance of losing weight after the pregnancy was emphasized, as well as doing a 75 g sugar test after postpartum exam and to have annual checkups every 1-3 years for blood sugars to make sure she does not develop diabetes.        Mild fetal growth restriction  She declined amniocentesis . She declined cfDNA . She was advised of need for  evaluation.    Potential etiologies of FGR include but are not limited to normal variation of stature, placental insufficiency, chromosomal abnormalities, genetic disorders, infections, medical conditions, teratogen exposure and other etiologies.      Complications of growth-restricted neonates include hypoglycemia, hyperbilirubinemia, hypothermia, seizures, sepsis, IVH, RDS, necrotizing enterocolitis, and  asphyxia. Long-term complications include cognitive delay and adult diseases such as cardiovascular disorders and type 2 diabetes. There is an increased risk (~20%) for recurrence of fetal growth restriction in a future pregnancy.    Because of increased risk of stillbirth, will monitor interval fetal growth every 3 weeks and do twice weekly fetal testing, including an NST at least once per week. She was previously instructed that fetal testing is not a 100% protective against the risk of fetal demise in-utero. Reviewed the importance of doing fetal kick counts three times a day and resting in a lateral recumbent position and reporting immediately at any time there is any decreased fetal movement even if the same day of testing was emphasized.     Delivery is not indicated at this time, as risks of  mortality and morbidity  with delivery, outweigh risks with continued pregnancy. Likewise, with stable condition, or too early a gestational age, steroids (and magnesium sulfate) are not recommended, as benefit is reaped only if suspected imminent delivery in few days which, although possible, is very unlikely at this time. Plan delivery 38 weeks if continued fetal growth and reassuring fetal testing.  Earlier delivery may be needed if worsening fetal growth, abnormal doppler, or abnormal fetal testing or other concerns.       Leiomyoma in pregnancy  Risks of fibroids include  delivery,  PROM, abnormal presentations, higher risk of  sections, placenta previa, fibroid degeneration, poor fetal growth, oligohydramnios, vaginal bleeding and postpartum hemorrhage and in rare circumstances obstruction of lower uterus/cervix, impeding vaginal delivery    She was instructed to report any increased pain, cramps, vaginal discharge or bleeding. Emphasized the importance of monitoring fetal kick count activity, and reporting immediately if decreased fetal movement occurs.      Increased BMI in pregnancy  She was advised of the importance of eating healthy and and limiting weight gain to 15-25lbs during the pregnancy, as optimal in this situation. Discussed the importance of losing weight after this pregnancy, especially before attempting future pregnancy. Breastfeeding may be an important tool in reducing the postpartum weight retention. Excess weight gain would be associated with gestational hypertension, gestational diabetes and adverse  outcomes, including fetal demise in utero.      Elevated blood pressure x1 without diagnosis of hypertension  BP Readings from Last 1 Encounters:   24 (!) 126/95   There was another blood pressure reading that was 121/92.    Patient stated that she might have had an elevation blood pressure when she was at [facility] in [place] last Saturday around 140/90 but she has not  sure of that.  We will plan to try to get her evaluated at the hospital.  If confirmed gestational hypertension, whether by confirmation of previous elevation and elevation today, or more elevations at the hospital today, or abnormal labs thene recommend proceeding with delivery, at this time, as the risks of prematurity will be outweighed by the risk of continued pregnancy in this setting of elevated blood pressure and mild fetal growth restriction.  Patient's questions were answered she understands and agreement with plan of care..  We will get preeclampsia labs.  Discussed with Dr. Hernandez.    If patient is undelivered will see her 1 more time next week.    Follow up in about 1 week (around 1/18/2024) for MFM follow-up, BPP and umbilical artery Doppler (if undelivered).     Future Appointments   Date Time Provider Department Center   4/29/2024 11:00 AM [facility] Tavarez [facility]        DEL involvement: Patient was evaluated and examined by Dr. Willams. ADRIAN Hicks, helped in pre charting of part of note.    This note was created with the assistance of [employer] voice recognition software. There may be transcription errors as a result of using this technology, however minimal. Effort has been made to ensure accuracy of transcription, but any obvious errors or omissions should be clarified with the author of the document.

## 2024-01-13 LAB
CREAT 24H UR-MCNC: 1216.8 MG/DAY (ref 950–2490)
CREAT UR-MCNC: 78.5 MG/DL (ref 45–106)
PROT 24H UR-MCNC: 148.8 MG/24 H (ref 0–165)
PROT UR STRIP-MCNC: 9.6 MG/DL
TOTAL VOLUME  (OHS): 1550 ML
TOTAL VOLUME  (OHS): 1550 ML

## 2024-01-13 NOTE — DISCHARGE SUMMARY
[facility] - Antepartum  Discharge Summary  OBGYN    Admission date: 1/11/2024  Discharge date: 1/11/2024    Admit Dx:    Hypertension affecting pregnancy [O16.9]  Discharge Dx:  Hypertension affecting pregnancy [O16.9]    Attending Physician: Cruz Hernandez   Discharge Provider: Cruz Hernandez    Procedures Performed: * No surgery found *    Hospital Course: Patient was admitted on 1/11/2024 .  Procedure was uncomplicated, for full details see operative report. Barring any unforseen incidents, patient will be discharged home when she is able to ambulate, void, and tolerate PO intake.    Consults: none      Discharged Condition: stable    Disposition: Home    Follow Up:   1 weeks in office.

## 2024-01-15 ENCOUNTER — HOSPITAL ENCOUNTER (INPATIENT)
Facility: HOSPITAL | Age: 29
LOS: 3 days | Discharge: HOME | End: 2024-01-18
Attending: OBSTETRICS & GYNECOLOGY | Admitting: OBSTETRICS & GYNECOLOGY
Payer: COMMERCIAL

## 2024-01-15 ENCOUNTER — ANESTHESIA EVENT (OUTPATIENT)
Dept: OBSTETRICS AND GYNECOLOGY | Facility: HOSPITAL | Age: 29
End: 2024-01-15
Payer: COMMERCIAL

## 2024-01-15 ENCOUNTER — ANESTHESIA (OUTPATIENT)
Dept: OBSTETRICS AND GYNECOLOGY | Facility: HOSPITAL | Age: 29
End: 2024-01-15
Payer: COMMERCIAL

## 2024-01-15 DIAGNOSIS — O16.9 HYPERTENSION AFFECTING PREGNANCY: ICD-10-CM

## 2024-01-15 DIAGNOSIS — O24.415 GESTATIONAL DIABETES MELLITUS (GDM) IN THIRD TRIMESTER CONTROLLED ON ORAL HYPOGLYCEMIC DRUG: Primary | ICD-10-CM

## 2024-01-15 LAB
ABORH RETYPE: NORMAL
BASOPHILS # BLD AUTO: 0.03 X10(3)/MCL
BASOPHILS NFR BLD AUTO: 0.3 %
EOSINOPHIL # BLD AUTO: 0.12 X10(3)/MCL (ref 0–0.9)
EOSINOPHIL NFR BLD AUTO: 1.2 %
ERYTHROCYTE [DISTWIDTH] IN BLOOD BY AUTOMATED COUNT: 13.2 % (ref 11.5–17)
GROUP & RH: NORMAL
HCT VFR BLD AUTO: 40.1 % (ref 37–47)
HGB BLD-MCNC: 12.7 G/DL (ref 12–16)
IMM GRANULOCYTES # BLD AUTO: 0.08 X10(3)/MCL (ref 0–0.04)
IMM GRANULOCYTES NFR BLD AUTO: 0.8 %
INDIRECT COOMBS: NORMAL
LYMPHOCYTES # BLD AUTO: 1.4 X10(3)/MCL (ref 0.6–4.6)
LYMPHOCYTES NFR BLD AUTO: 14.6 %
MCH RBC QN AUTO: 27.5 PG (ref 27–31)
MCHC RBC AUTO-ENTMCNC: 31.7 G/DL (ref 33–36)
MCV RBC AUTO: 86.8 FL (ref 80–94)
MONOCYTES # BLD AUTO: 0.56 X10(3)/MCL (ref 0.1–1.3)
MONOCYTES NFR BLD AUTO: 5.8 %
NEUTROPHILS # BLD AUTO: 7.42 X10(3)/MCL (ref 2.1–9.2)
NEUTROPHILS NFR BLD AUTO: 77.3 %
NRBC BLD AUTO-RTO: 0 %
PLATELET # BLD AUTO: 341 X10(3)/MCL (ref 130–400)
PMV BLD AUTO: 9.4 FL (ref 7.4–10.4)
POCT GLUCOSE: 70 MG/DL (ref 70–110)
PRENATAL STREP B CULTURE: NEGATIVE
RBC # BLD AUTO: 4.62 X10(6)/MCL (ref 4.2–5.4)
SPECIMEN OUTDATE: NORMAL
T PALLIDUM AB SER QL: NONREACTIVE
WBC # SPEC AUTO: 9.61 X10(3)/MCL (ref 4.5–11.5)

## 2024-01-15 PROCEDURE — 87653 STREP B DNA AMP PROBE: CPT | Performed by: OBSTETRICS & GYNECOLOGY

## 2024-01-15 PROCEDURE — 63600175 PHARM REV CODE 636 W HCPCS: Performed by: OBSTETRICS & GYNECOLOGY

## 2024-01-15 PROCEDURE — 86780 TREPONEMA PALLIDUM: CPT | Performed by: OBSTETRICS & GYNECOLOGY

## 2024-01-15 PROCEDURE — 11000001 HC ACUTE MED/SURG PRIVATE ROOM

## 2024-01-15 PROCEDURE — 3E0P7VZ INTRODUCTION OF HORMONE INTO FEMALE REPRODUCTIVE, VIA NATURAL OR ARTIFICIAL OPENING: ICD-10-PCS | Performed by: OBSTETRICS & GYNECOLOGY

## 2024-01-15 PROCEDURE — 86901 BLOOD TYPING SEROLOGIC RH(D): CPT | Performed by: OBSTETRICS & GYNECOLOGY

## 2024-01-15 PROCEDURE — 85025 COMPLETE CBC W/AUTO DIFF WBC: CPT | Performed by: OBSTETRICS & GYNECOLOGY

## 2024-01-15 RX ORDER — OXYTOCIN/RINGER'S LACTATE 30/500 ML
0-30 PLASTIC BAG, INJECTION (ML) INTRAVENOUS CONTINUOUS
Status: DISCONTINUED | OUTPATIENT
Start: 2024-01-15 | End: 2024-01-18 | Stop reason: HOSPADM

## 2024-01-15 RX ORDER — LABETALOL HCL 20 MG/4 ML
80 SYRINGE (ML) INTRAVENOUS ONCE AS NEEDED
Status: DISCONTINUED | OUTPATIENT
Start: 2024-01-15 | End: 2024-01-18 | Stop reason: HOSPADM

## 2024-01-15 RX ORDER — OXYTOCIN 10 [USP'U]/ML
10 INJECTION, SOLUTION INTRAMUSCULAR; INTRAVENOUS ONCE AS NEEDED
Status: DISCONTINUED | OUTPATIENT
Start: 2024-01-15 | End: 2024-01-17

## 2024-01-15 RX ORDER — DIPHENOXYLATE HYDROCHLORIDE AND ATROPINE SULFATE 2.5; .025 MG/1; MG/1
2 TABLET ORAL EVERY 6 HOURS PRN
Status: DISCONTINUED | OUTPATIENT
Start: 2024-01-15 | End: 2024-01-17

## 2024-01-15 RX ORDER — LABETALOL HCL 20 MG/4 ML
20 SYRINGE (ML) INTRAVENOUS ONCE AS NEEDED
Status: DISCONTINUED | OUTPATIENT
Start: 2024-01-15 | End: 2024-01-18 | Stop reason: HOSPADM

## 2024-01-15 RX ORDER — OXYTOCIN/RINGER'S LACTATE 30/500 ML
334 PLASTIC BAG, INJECTION (ML) INTRAVENOUS ONCE
Status: DISCONTINUED | OUTPATIENT
Start: 2024-01-15 | End: 2024-01-18 | Stop reason: HOSPADM

## 2024-01-15 RX ORDER — LIDOCAINE HYDROCHLORIDE 10 MG/ML
10 INJECTION INFILTRATION; PERINEURAL ONCE AS NEEDED
Status: DISCONTINUED | OUTPATIENT
Start: 2024-01-15 | End: 2024-01-18 | Stop reason: HOSPADM

## 2024-01-15 RX ORDER — SODIUM CHLORIDE, SODIUM LACTATE, POTASSIUM CHLORIDE, CALCIUM CHLORIDE 600; 310; 30; 20 MG/100ML; MG/100ML; MG/100ML; MG/100ML
INJECTION, SOLUTION INTRAVENOUS CONTINUOUS
Status: DISCONTINUED | OUTPATIENT
Start: 2024-01-15 | End: 2024-01-18 | Stop reason: HOSPADM

## 2024-01-15 RX ORDER — MISOPROSTOL 100 UG/1
800 TABLET ORAL ONCE AS NEEDED
Status: DISCONTINUED | OUTPATIENT
Start: 2024-01-15 | End: 2024-01-17

## 2024-01-15 RX ORDER — OXYTOCIN/RINGER'S LACTATE 30/500 ML
95 PLASTIC BAG, INJECTION (ML) INTRAVENOUS ONCE
Status: DISCONTINUED | OUTPATIENT
Start: 2024-01-15 | End: 2024-01-17

## 2024-01-15 RX ORDER — OXYTOCIN/RINGER'S LACTATE 30/500 ML
334 PLASTIC BAG, INJECTION (ML) INTRAVENOUS ONCE AS NEEDED
Status: DISCONTINUED | OUTPATIENT
Start: 2024-01-15 | End: 2024-01-17

## 2024-01-15 RX ORDER — HYDRALAZINE HYDROCHLORIDE 20 MG/ML
10 INJECTION INTRAMUSCULAR; INTRAVENOUS ONCE AS NEEDED
Status: DISCONTINUED | OUTPATIENT
Start: 2024-01-15 | End: 2024-01-18 | Stop reason: HOSPADM

## 2024-01-15 RX ORDER — CARBOPROST TROMETHAMINE 250 UG/ML
250 INJECTION, SOLUTION INTRAMUSCULAR
Status: DISCONTINUED | OUTPATIENT
Start: 2024-01-15 | End: 2024-01-17

## 2024-01-15 RX ORDER — OXYTOCIN/RINGER'S LACTATE 30/500 ML
95 PLASTIC BAG, INJECTION (ML) INTRAVENOUS ONCE AS NEEDED
Status: DISCONTINUED | OUTPATIENT
Start: 2024-01-15 | End: 2024-01-17

## 2024-01-15 RX ORDER — ERGOCALCIFEROL 1.25 MG/1
50000 CAPSULE ORAL
COMMUNITY

## 2024-01-15 RX ORDER — METHYLERGONOVINE MALEATE 0.2 MG/ML
200 INJECTION INTRAVENOUS ONCE AS NEEDED
Status: DISCONTINUED | OUTPATIENT
Start: 2024-01-15 | End: 2024-01-17

## 2024-01-15 RX ORDER — LABETALOL HCL 20 MG/4 ML
40 SYRINGE (ML) INTRAVENOUS ONCE AS NEEDED
Status: DISCONTINUED | OUTPATIENT
Start: 2024-01-15 | End: 2024-01-18 | Stop reason: HOSPADM

## 2024-01-15 RX ADMIN — SODIUM CHLORIDE, POTASSIUM CHLORIDE, SODIUM LACTATE AND CALCIUM CHLORIDE: 600; 310; 30; 20 INJECTION, SOLUTION INTRAVENOUS at 03:01

## 2024-01-15 RX ADMIN — Medication 2 MILLI-UNITS/MIN: at 07:01

## 2024-01-15 RX ADMIN — SODIUM CHLORIDE, POTASSIUM CHLORIDE, SODIUM LACTATE AND CALCIUM CHLORIDE: 600; 310; 30; 20 INJECTION, SOLUTION INTRAVENOUS at 07:01

## 2024-01-15 NOTE — H&P
HISTORY AND PHYSICAL                                                OBSTETRICS        Chief Complaint: Labor induction     Subjective:      Corinne Haley is a 28 y.o.  female with IUP at 37w4d gestation who presents to L&D for induction of labor.  Patient denies vaginal bleeding, leaking of fluid, or regular contractions.    Although she does have GDM and newly diagnosed Gest HT, the patient's pregnancy has been uncomplicated until now.  Care this pregnancy has been with Dr. Hernandez.  See antepartum record for details.      PMHx:   Past Medical History:   Diagnosis Date    GDM (gestational diabetes mellitus) 2023    Thyroid nodule        PSHx:   Past Surgical History:   Procedure Laterality Date    TONSILLECTOMY  2022       All:   Review of patient's allergies indicates:   Allergen Reactions    Sulfa (sulfonamide antibiotics)        Meds:   Medications Prior to Admission   Medication Sig Dispense Refill Last Dose    metFORMIN (GLUCOPHAGE-XR) 500 MG ER 24hr tablet Take 2 tablets (1,000 mg total) by mouth Daily. 60 tablet 3        SH:   Social History     Socioeconomic History    Marital status:    Tobacco Use    Smoking status: Never    Smokeless tobacco: Never   Substance and Sexual Activity    Alcohol use: Never    Drug use: Never    Sexual activity: Yes     Partners: Male       FH:   Family History   Problem Relation Age of Onset    Melanoma Paternal Grandfather     Lung cancer Paternal Grandfather     Hypertension Maternal Grandmother     Obesity Maternal Grandmother     Thyroid disease Maternal Grandmother     Stroke Maternal Grandfather     Hypertension Maternal Grandfather     Diabetes Maternal Grandfather     Cancer Father     Melanoma Father     Asthma Mother     Thyroid disease Mother     Hypertension Mother     Gallbladder disease Mother     Asthma Sister        OBHx:   OB History    Para Term  AB Living   1 0 0 0 0 0   SAB IAB Ectopic Multiple Live Births   0 0 0 0 0       # Outcome Date GA Lbr Mike/2nd Weight Sex Delivery Anes PTL Lv   1 Current                Objective:      [unfilled]  [unfilled]  BMI Readings from Last 1 Encounters:   01/15/24 31.31 kg/m²         General:   alert and cooperative   HEENT:  normocephalic, atraumatic   Lungs:   Normal work of breathing   Heart:   Normal cap refill   Abdomen:  gravid, non-tender   Extremities non-tender, no edema   Derm: no rashes or lesions   Psych: appropriate mood and affect   Pelvis:  adequate       Cervix:     Dilation: 0 cm    Effacement: Long    Station:  -3     Vertex presentation by palpation    Lab Review  Prenatal Labs:  Lab Results   Component Value Date    GROUPTRH O POS 08/15/2023    HGB 12.2 2024    HCT 37.5 2024     2024    HEPBSAG Negative 2023    [id number] Non Reactive 2023    LABURIN Final report 2023        Assessment:     28 y.o.  at 37w4d gestation   2. GDM  3. Gest HTN, here for induction of labor     Plan:     1. Risks, benefits, alternatives and possible complications of delivery, possible , possible blood transfusion, possible operative vaginal delivery have been discussed in detail with the patient. All questions have been answered, and Ms. Silva has voiced understanding and agrees to the treatment plan.  2. Admit to Labor and Delivery unit for cervical ripening with Cervidil, and then labor induction with Pitocin.

## 2024-01-16 LAB
GLUCOSE SERPL-MCNC: 68 MG/DL (ref 70–110)
GLUCOSE SERPL-MCNC: 73 MG/DL (ref 70–110)
POCT GLUCOSE: 68 MG/DL (ref 70–110)
POCT GLUCOSE: 72 MG/DL (ref 70–110)
POCT GLUCOSE: 73 MG/DL (ref 70–110)
POCT GLUCOSE: 76 MG/DL (ref 70–110)

## 2024-01-16 PROCEDURE — 72100003 HC LABOR CARE, EA. ADDL. 8 HRS

## 2024-01-16 PROCEDURE — 63600175 PHARM REV CODE 636 W HCPCS: Performed by: ANESTHESIOLOGY

## 2024-01-16 PROCEDURE — 72100002 HC LABOR CARE, 1ST 8 HOURS

## 2024-01-16 PROCEDURE — 25000003 PHARM REV CODE 250

## 2024-01-16 PROCEDURE — 59409 OBSTETRICAL CARE: CPT | Mod: AA,P2,, | Performed by: ANESTHESIOLOGY

## 2024-01-16 PROCEDURE — 11000001 HC ACUTE MED/SURG PRIVATE ROOM

## 2024-01-16 PROCEDURE — 25000003 PHARM REV CODE 250: Performed by: ANESTHESIOLOGY

## 2024-01-16 PROCEDURE — 63600175 PHARM REV CODE 636 W HCPCS: Performed by: OBSTETRICS & GYNECOLOGY

## 2024-01-16 PROCEDURE — 62326 NJX INTERLAMINAR LMBR/SAC: CPT | Performed by: ANESTHESIOLOGY

## 2024-01-16 PROCEDURE — 63600175 PHARM REV CODE 636 W HCPCS: Mod: JZ,JG | Performed by: ANESTHESIOLOGY

## 2024-01-16 PROCEDURE — 51702 INSERT TEMP BLADDER CATH: CPT

## 2024-01-16 RX ORDER — DIPHENHYDRAMINE HYDROCHLORIDE 50 MG/ML
12.5 INJECTION INTRAMUSCULAR; INTRAVENOUS EVERY 4 HOURS PRN
Status: DISCONTINUED | OUTPATIENT
Start: 2024-01-16 | End: 2024-01-18 | Stop reason: HOSPADM

## 2024-01-16 RX ORDER — FENTANYL/BUPIVACAINE/NS/PF 2-1250MCG
PLASTIC BAG, INJECTION (ML) INJECTION CONTINUOUS PRN
Status: DISCONTINUED | OUTPATIENT
Start: 2024-01-16 | End: 2024-01-17

## 2024-01-16 RX ORDER — BUPIVACAINE HYDROCHLORIDE 2.5 MG/ML
INJECTION, SOLUTION EPIDURAL; INFILTRATION; INTRACAUDAL
Status: COMPLETED | OUTPATIENT
Start: 2024-01-16 | End: 2024-01-16

## 2024-01-16 RX ORDER — EPHEDRINE SULFATE 50 MG/ML
10 INJECTION, SOLUTION INTRAVENOUS ONCE AS NEEDED
Status: COMPLETED | OUTPATIENT
Start: 2024-01-16 | End: 2024-01-16

## 2024-01-16 RX ORDER — ONDANSETRON HYDROCHLORIDE 2 MG/ML
4 INJECTION, SOLUTION INTRAVENOUS ONCE AS NEEDED
Status: COMPLETED | OUTPATIENT
Start: 2024-01-16 | End: 2024-01-16

## 2024-01-16 RX ORDER — EPHEDRINE SULFATE 50 MG/ML
INJECTION, SOLUTION INTRAVENOUS
Status: COMPLETED
Start: 2024-01-16 | End: 2024-01-16

## 2024-01-16 RX ORDER — SODIUM CITRATE AND CITRIC ACID MONOHYDRATE 334; 500 MG/5ML; MG/5ML
30 SOLUTION ORAL ONCE
Status: DISCONTINUED | OUTPATIENT
Start: 2024-01-16 | End: 2024-01-16

## 2024-01-16 RX ORDER — EPHEDRINE SULFATE 50 MG/ML
10 INJECTION, SOLUTION INTRAVENOUS ONCE AS NEEDED
Status: DISCONTINUED | OUTPATIENT
Start: 2024-01-16 | End: 2024-01-18 | Stop reason: HOSPADM

## 2024-01-16 RX ADMIN — ONDANSETRON 4 MG: 2 INJECTION INTRAMUSCULAR; INTRAVENOUS at 07:01

## 2024-01-16 RX ADMIN — Medication 12 ML/HR: at 10:01

## 2024-01-16 RX ADMIN — Medication 12 ML/HR: at 03:01

## 2024-01-16 RX ADMIN — SODIUM CHLORIDE, POTASSIUM CHLORIDE, SODIUM LACTATE AND CALCIUM CHLORIDE: 600; 310; 30; 20 INJECTION, SOLUTION INTRAVENOUS at 04:01

## 2024-01-16 RX ADMIN — SODIUM CHLORIDE, POTASSIUM CHLORIDE, SODIUM LACTATE AND CALCIUM CHLORIDE 1000 ML: 600; 310; 30; 20 INJECTION, SOLUTION INTRAVENOUS at 03:01

## 2024-01-16 RX ADMIN — SODIUM CHLORIDE, POTASSIUM CHLORIDE, SODIUM LACTATE AND CALCIUM CHLORIDE: 600; 310; 30; 20 INJECTION, SOLUTION INTRAVENOUS at 07:01

## 2024-01-16 RX ADMIN — EPHEDRINE SULFATE 10 MG: 50 INJECTION INTRAVENOUS at 05:01

## 2024-01-16 RX ADMIN — EPHEDRINE SULFATE 10 MG: 50 INJECTION, SOLUTION INTRAVENOUS at 05:01

## 2024-01-16 RX ADMIN — Medication 2 MILLI-UNITS/MIN: at 08:01

## 2024-01-16 RX ADMIN — BUPIVACAINE HYDROCHLORIDE 10 ML: 2.5 INJECTION, SOLUTION EPIDURAL; INFILTRATION; INTRACAUDAL; PERINEURAL at 03:01

## 2024-01-16 NOTE — ANESTHESIA PROCEDURE NOTES
Epidural    Patient location during procedure: OB   Reason for block: primary anesthetic   Reason for block: labor analgesia requested by patient and obstetrician  Diagnosis: IUP   Start time: 1/16/2024 3:22 PM  Timeout: 1/16/2024 3:22 PM  End time: 1/16/2024 3:39 PM    Staffing  Performing Provider: Frank Lemons MD  Authorizing Provider: Frank Lemons MD    Staffing  Performed by: Frank Lemons MD  Authorized by: Frank Lemons MD        Preanesthetic Checklist  Completed: patient identified, IV checked, site marked, risks and benefits discussed, surgical consent, monitors and equipment checked, pre-op evaluation, timeout performed, anesthesia consent given, hand hygiene performed and patient being monitored  Preparation  Patient position: sitting  Prep: ChloraPrep  Reason for block: primary anesthetic   Epidural  Skin Anesthetic: lidocaine 1%  Administration type: continuous  Approach: midline  Interspace: L3-4    Injection technique: MATT saline  Needle and Epidural Catheter  Needle type: Tuohy   Needle gauge: 17  Needle length: 3.5 inches  Catheter type: multi-orifice  Catheter size: 19 G  Insertion Attempts: 1  Test dose: 3 mL of lidocaine 1.5% with Epi 1-to-200,000  Additional Documentation: negative aspiration for heme and CSF, no signs/symptoms of IV or SA injection, no significant complaints from patient, no paresthesia on injection and no significant pain on injection  Needle localization: anatomical landmarks  Assessment  Patient's tolerance of the procedure: no complaints  Additional Notes  Placed w/o difficulty.  1 Attempt  Cath 3cm cephalad w/o heme, CSF, or paresthesia.  Continuous infusion 0.125% Bupivacaine w/ Fentanyl 2 mcg/cc at 12 cc/h    See Labor Record for Vitals No inadvertent dural puncture with Tuohy.  Dural puncture not performed with spinal needle    Medications:    Medications: bupivacaine (pf) (MARCAINE) injection 0.25% - Epidural   10 mL - 1/16/2024 3:22:00 PM

## 2024-01-16 NOTE — PLAN OF CARE
Problem: Adult Inpatient Plan of Care  Goal: Plan of Care Review  Outcome: Ongoing, Progressing  Goal: Patient-Specific Goal (Individualized)  Outcome: Ongoing, Progressing  Goal: Absence of Hospital-Acquired Illness or Injury  Outcome: Ongoing, Progressing  Goal: Optimal Comfort and Wellbeing  Outcome: Ongoing, Progressing  Goal: Readiness for Transition of Care  Outcome: Ongoing, Progressing     Problem: Diabetes in Pregnancy  Goal: Blood Glucose Level Within Targeted Range  Outcome: Ongoing, Progressing     Problem: Infection  Goal: Absence of Infection Signs and Symptoms  Outcome: Ongoing, Progressing     Problem:  Fall Injury Risk  Goal: Absence of Fall, Infant Drop and Related Injury  Outcome: Ongoing, Progressing     Problem: Bleeding (Labor)  Goal: Hemostasis  Outcome: Ongoing, Progressing     Problem: Change in Fetal Wellbeing (Labor)  Goal: Stable Fetal Wellbeing  Outcome: Ongoing, Progressing     Problem: Delayed Labor Progression (Labor)  Goal: Effective Progression to Delivery  Outcome: Ongoing, Progressing     Problem: Infection (Labor)  Goal: Absence of Infection Signs and Symptoms  Outcome: Ongoing, Progressing     Problem: Labor Pain (Labor)  Goal: Acceptable Pain Control  Outcome: Ongoing, Progressing     Problem: Uterine Tachysystole (Labor)  Goal: Normal Uterine Contraction Pattern  Outcome: Ongoing, Progressing

## 2024-01-16 NOTE — NURSING
RN at bedside, Vital signs taken. Pt asked to nto take blood pressure every 15 minutes because it irritates arm, pitocin also asked to be lowered.

## 2024-01-16 NOTE — ANESTHESIA PREPROCEDURE EVALUATION
2024  Corinne Haley is a 28 y.o., female.    /73   Pulse 71   Temp 36.7 °C (98 °F)   Resp 18   LMP 2023 (Approximate)   SpO2 98%   Breastfeeding No     Past Medical History:   Diagnosis Date    GDM (gestational diabetes mellitus) 2023    Thyroid nodule     gets biopsy yearly, always negative       OB History    Para Term  AB Living   1             SAB IAB Ectopic Multiple Live Births                  # Outcome Date GA Lbr Mike/2nd Weight Sex Delivery Anes PTL Lv   1 Current                Wt Readings from Last 1 Encounters:   01/15/24 1249 88 kg (194 lb 0.1 oz)   01/15/24 1240 88 kg (194 lb 0.1 oz)       BP Readings from Last 3 Encounters:   24 130/73   01/15/24 (!) 135/91   24 123/72       Patient Active Problem List   Diagnosis    Gestational diabetes mellitus (GDM) in third trimester controlled on oral hypoglycemic drug    Poor fetal growth affecting management of mother in third trimester    (BMI 25.0-29.9) earlier in pregnancy    Leiomyoma of uterus affecting pregnancy in third trimester    Elevated blood pressure affecting pregnancy in third trimester, antepartum       Past Surgical History:   Procedure Laterality Date    TONSILLECTOMY  2022       Social History     Socioeconomic History    Marital status:    Tobacco Use    Smoking status: Never    Smokeless tobacco: Never   Substance and Sexual Activity    Alcohol use: Not Currently    Drug use: Never    Sexual activity: Yes     Partners: Male         Chemistry        Component Value Date/Time     2024 1658    K 4.0 2024 1658    CO2 21 (L) 2024 1658    BUN 11.9 2024 1658    CREATININE 0.61 2024 1658        Component Value Date/Time    CALCIUM 9.2 2024 1658    ALKPHOS 132 2024 1658    AST 14 2024 1658    ALT 8 2024 1658    BILITOT  "0.3 01/11/2024 1658            Lab Results   Component Value Date    WBC 9.61 01/15/2024    HGB 12.7 01/15/2024    HCT 40.1 01/15/2024    MCV 86.8 01/15/2024     01/15/2024       No results for input(s): "PT", "INR", "PROTIME", "APTT" in the last 72 hours.              Pre-op Assessment    I have reviewed the Patient Summary Reports.    I have reviewed the NPO Status.   I have reviewed the Medications.     Review of Systems  Anesthesia Hx:  No problems with previous Anesthesia             Denies Family Hx of Anesthesia complications.    Denies Personal Hx of Anesthesia complications.                    Cardiovascular:  Cardiovascular Normal                   No Cardiac Complaints                         Pulmonary:  Pulmonary Normal        No Pulmonary Complaints               Hepatic/GI:        No Current GERD Sx              Physical Exam  General: Alert and Oriented    Airway:  Mallampati: II   Mouth Opening: Normal  TM Distance: Normal  Tongue: Normal  Neck ROM: Normal ROM    Dental:  Intact    Chest/Lungs:  Clear to auscultation, Normal Respiratory Rate    Heart:  Rate: Normal  Rhythm: Regular Rhythm        Anesthesia Plan  Type of Anesthesia, risks & benefits discussed:    Anesthesia Type: Epidural  Intra-op Monitoring Plan: Standard ASA Monitors  Post Op Pain Control Plan: epidural analgesia  Induction:  IV  Airway Plan: Direct  Informed Consent: Informed consent signed with the Patient and all parties understand the risks and agree with anesthesia plan.  All questions answered. Patient consented to blood products? Yes  ASA Score: 2  Day of Surgery Review of History & Physical: H&P Update referred to the surgeon/provider.  Anesthesia Plan Notes: Premedication: Oral Bicitra and Lactated Ringers Preload  Disc placement of RONALDO, possibility of no/partial relief, risk of headache. Questions entertained, accepted.  Technique: Epidural for labor analgesia - plan to use if functioning well if C/S is required " for delivery  Please refer to nursing records for toco fetal heart tone information        Ready For Surgery From Anesthesia Perspective.     .

## 2024-01-17 LAB
POCT GLUCOSE: 92 MG/DL (ref 70–110)
STREP B PCR (OHS): NOT DETECTED

## 2024-01-17 PROCEDURE — 72200005 HC VAGINAL DELIVERY LEVEL II

## 2024-01-17 PROCEDURE — 63600175 PHARM REV CODE 636 W HCPCS: Performed by: OBSTETRICS & GYNECOLOGY

## 2024-01-17 PROCEDURE — 72100003 HC LABOR CARE, EA. ADDL. 8 HRS

## 2024-01-17 PROCEDURE — 11000001 HC ACUTE MED/SURG PRIVATE ROOM

## 2024-01-17 PROCEDURE — 25000003 PHARM REV CODE 250: Performed by: OBSTETRICS & GYNECOLOGY

## 2024-01-17 PROCEDURE — 0KQM0ZZ REPAIR PERINEUM MUSCLE, OPEN APPROACH: ICD-10-PCS | Performed by: OBSTETRICS & GYNECOLOGY

## 2024-01-17 RX ORDER — DIPHENHYDRAMINE HYDROCHLORIDE 50 MG/ML
25 INJECTION INTRAMUSCULAR; INTRAVENOUS EVERY 4 HOURS PRN
Status: DISCONTINUED | OUTPATIENT
Start: 2024-01-17 | End: 2024-01-18 | Stop reason: HOSPADM

## 2024-01-17 RX ORDER — OXYTOCIN/RINGER'S LACTATE 30/500 ML
95 PLASTIC BAG, INJECTION (ML) INTRAVENOUS ONCE
Status: DISCONTINUED | OUTPATIENT
Start: 2024-01-17 | End: 2024-01-18 | Stop reason: HOSPADM

## 2024-01-17 RX ORDER — DIPHENHYDRAMINE HCL 25 MG
25 CAPSULE ORAL EVERY 4 HOURS PRN
Status: DISCONTINUED | OUTPATIENT
Start: 2024-01-17 | End: 2024-01-18 | Stop reason: HOSPADM

## 2024-01-17 RX ORDER — PROCHLORPERAZINE EDISYLATE 5 MG/ML
5 INJECTION INTRAMUSCULAR; INTRAVENOUS EVERY 6 HOURS PRN
Status: DISCONTINUED | OUTPATIENT
Start: 2024-01-17 | End: 2024-01-18 | Stop reason: HOSPADM

## 2024-01-17 RX ORDER — ACETAMINOPHEN 325 MG/1
650 TABLET ORAL EVERY 6 HOURS PRN
Status: DISCONTINUED | OUTPATIENT
Start: 2024-01-17 | End: 2024-01-18 | Stop reason: HOSPADM

## 2024-01-17 RX ORDER — CARBOPROST TROMETHAMINE 250 UG/ML
250 INJECTION, SOLUTION INTRAMUSCULAR
Status: DISCONTINUED | OUTPATIENT
Start: 2024-01-17 | End: 2024-01-18 | Stop reason: HOSPADM

## 2024-01-17 RX ORDER — SIMETHICONE 80 MG
1 TABLET,CHEWABLE ORAL EVERY 6 HOURS PRN
Status: DISCONTINUED | OUTPATIENT
Start: 2024-01-17 | End: 2024-01-18 | Stop reason: HOSPADM

## 2024-01-17 RX ORDER — HYDROCORTISONE 25 MG/G
CREAM TOPICAL 3 TIMES DAILY PRN
Status: DISCONTINUED | OUTPATIENT
Start: 2024-01-17 | End: 2024-01-18 | Stop reason: HOSPADM

## 2024-01-17 RX ORDER — MISOPROSTOL 100 UG/1
800 TABLET ORAL ONCE AS NEEDED
Status: DISCONTINUED | OUTPATIENT
Start: 2024-01-17 | End: 2024-01-18 | Stop reason: HOSPADM

## 2024-01-17 RX ORDER — HYDROCODONE BITARTRATE AND ACETAMINOPHEN 5; 325 MG/1; MG/1
1 TABLET ORAL EVERY 4 HOURS PRN
Status: DISCONTINUED | OUTPATIENT
Start: 2024-01-17 | End: 2024-01-18 | Stop reason: HOSPADM

## 2024-01-17 RX ORDER — PRENATAL WITH FERROUS FUM AND FOLIC ACID 3080; 920; 120; 400; 22; 1.84; 3; 20; 10; 1; 12; 200; 27; 25; 2 [IU]/1; [IU]/1; MG/1; [IU]/1; MG/1; MG/1; MG/1; MG/1; MG/1; MG/1; UG/1; MG/1; MG/1; MG/1; MG/1
1 TABLET ORAL DAILY
Status: DISCONTINUED | OUTPATIENT
Start: 2024-01-17 | End: 2024-01-18 | Stop reason: HOSPADM

## 2024-01-17 RX ORDER — DOCUSATE SODIUM 100 MG/1
200 CAPSULE, LIQUID FILLED ORAL 2 TIMES DAILY PRN
Status: DISCONTINUED | OUTPATIENT
Start: 2024-01-17 | End: 2024-01-18 | Stop reason: HOSPADM

## 2024-01-17 RX ORDER — METHYLERGONOVINE MALEATE 0.2 MG/ML
200 INJECTION INTRAVENOUS ONCE AS NEEDED
Status: DISCONTINUED | OUTPATIENT
Start: 2024-01-17 | End: 2024-01-18 | Stop reason: HOSPADM

## 2024-01-17 RX ORDER — OXYTOCIN/RINGER'S LACTATE 30/500 ML
95 PLASTIC BAG, INJECTION (ML) INTRAVENOUS ONCE AS NEEDED
Status: DISCONTINUED | OUTPATIENT
Start: 2024-01-17 | End: 2024-01-18 | Stop reason: HOSPADM

## 2024-01-17 RX ORDER — OXYTOCIN/RINGER'S LACTATE 30/500 ML
334 PLASTIC BAG, INJECTION (ML) INTRAVENOUS ONCE AS NEEDED
Status: DISCONTINUED | OUTPATIENT
Start: 2024-01-17 | End: 2024-01-18 | Stop reason: HOSPADM

## 2024-01-17 RX ORDER — HYDROCODONE BITARTRATE AND ACETAMINOPHEN 10; 325 MG/1; MG/1
1 TABLET ORAL EVERY 4 HOURS PRN
Status: DISCONTINUED | OUTPATIENT
Start: 2024-01-17 | End: 2024-01-18 | Stop reason: HOSPADM

## 2024-01-17 RX ORDER — IBUPROFEN 600 MG/1
600 TABLET ORAL EVERY 6 HOURS PRN
Status: DISCONTINUED | OUTPATIENT
Start: 2024-01-17 | End: 2024-01-18 | Stop reason: HOSPADM

## 2024-01-17 RX ORDER — DIPHENOXYLATE HYDROCHLORIDE AND ATROPINE SULFATE 2.5; .025 MG/1; MG/1
2 TABLET ORAL EVERY 6 HOURS PRN
Status: DISCONTINUED | OUTPATIENT
Start: 2024-01-17 | End: 2024-01-18 | Stop reason: HOSPADM

## 2024-01-17 RX ORDER — OXYTOCIN 10 [USP'U]/ML
10 INJECTION, SOLUTION INTRAMUSCULAR; INTRAVENOUS ONCE AS NEEDED
Status: DISCONTINUED | OUTPATIENT
Start: 2024-01-17 | End: 2024-01-18 | Stop reason: HOSPADM

## 2024-01-17 RX ORDER — ONDANSETRON 4 MG/1
8 TABLET, ORALLY DISINTEGRATING ORAL EVERY 8 HOURS PRN
Status: DISCONTINUED | OUTPATIENT
Start: 2024-01-17 | End: 2024-01-18 | Stop reason: HOSPADM

## 2024-01-17 RX ADMIN — HYDROCODONE BITARTRATE AND ACETAMINOPHEN 1 TABLET: 10; 325 TABLET ORAL at 01:01

## 2024-01-17 RX ADMIN — IBUPROFEN 600 MG: 600 TABLET, FILM COATED ORAL at 09:01

## 2024-01-17 RX ADMIN — DOCUSATE SODIUM 200 MG: 100 CAPSULE, LIQUID FILLED ORAL at 08:01

## 2024-01-17 RX ADMIN — Medication: at 03:01

## 2024-01-17 RX ADMIN — IBUPROFEN 600 MG: 600 TABLET, FILM COATED ORAL at 03:01

## 2024-01-17 RX ADMIN — PRENATAL VITAMINS-IRON FUMARATE 27 MG IRON-FOLIC ACID 0.8 MG TABLET 1 TABLET: at 08:01

## 2024-01-17 RX ADMIN — OXYTOCIN 334 MILLI-UNITS/MIN: 10 INJECTION, SOLUTION INTRAMUSCULAR; INTRAVENOUS at 01:01

## 2024-01-17 RX ADMIN — HYDROCODONE BITARTRATE AND ACETAMINOPHEN 1 TABLET: 10; 325 TABLET ORAL at 05:01

## 2024-01-17 RX ADMIN — DOCUSATE SODIUM 200 MG: 100 CAPSULE, LIQUID FILLED ORAL at 09:01

## 2024-01-17 RX ADMIN — IBUPROFEN 600 MG: 600 TABLET, FILM COATED ORAL at 10:01

## 2024-01-17 RX ADMIN — HYDROCODONE BITARTRATE AND ACETAMINOPHEN 1 TABLET: 10; 325 TABLET ORAL at 08:01

## 2024-01-17 NOTE — PLAN OF CARE
Problem: Breastfeeding  Goal: Effective Breastfeeding  Outcome: Ongoing, Progressing  Intervention: Promote Breast Care and Comfort  Flowsheets (Taken 1/17/2024 1100)  Breast Care: Breastfeeding: open to air  Breast Pumping: hand expression utilized  Intervention: Promote Effective Breastfeeding  Flowsheets (Taken 1/17/2024 1100)  Breastfeeding Assistance:   feeding on demand promoted   feeding cue recognition promoted   hand expression verified  Parent/Child Attachment Promotion:   cue recognition promoted   skin-to-skin contact encouraged  Intervention: Support Exclusive Breastfeeding Success  Flowsheets (Taken 1/17/2024 1100)  Supportive Measures:   active listening utilized   counseling provided  Breastfeeding Support: lactation counseling provided

## 2024-01-17 NOTE — L&D DELIVERY NOTE
[facility] - Labor and Delivery  OBGYN  Operative Note    SUMMARY     Date of Procedure: 2024    Procedure: Spontaneous Vaginal Delivery    Surgeon: Cruz Hernandez MD    Post-Operative Diagnosis:   1. s/p  37w6d without complications  2. 2nd degree perineal laceration with repair      Anesthesia: epidural    Procedure in Detail: With good maternal effort a viable liveborn infant was delivered after approximately 105 minutes of pushing. The fetal head delivered. Nuchal cord was present. Remainder of infant delivered without difficulty. The placenta then delivered spontaneously, and was noted to be intact. The vagina, perineum, and cervix were examined. . After any necessary repairs were performed, all instruments and sponges were removed from the vagina. Sponge, lap, and needle counts were correct after the procedure.    Repair Suture: 2.0 vicryl in standard fashion    Infant: Liveborn male infant 3 vessel umbilical cord.  Apgars    Living status:   Apgar Component Scores:  1 min.:  5 min.:  10 min.:  15 min.:  20 min.:    Skin color:         Heart rate:         Reflex irritability:         Muscle tone:         Respiratory effort:         Total:                  Complications: none    Estimated Blood Loss (EBL): 200 cc           Condition: Mother and baby doing well

## 2024-01-17 NOTE — ANESTHESIA POSTPROCEDURE EVALUATION
Anesthesia Post Evaluation    Patient: Corinne Haley    Procedure(s) Performed: * No procedures listed *    Final Anesthesia Type: epidural      Patient location during evaluation: floor  Patient participation: Yes- Able to Participate  Level of consciousness: awake and alert  Post-procedure vital signs: reviewed and stable  Pain management: adequate  Airway patency: patent    PONV status at discharge: vomiting (controlled) and nausea (controlled)  Anesthetic complications: no      Cardiovascular status: hemodynamically stable  Respiratory status: spontaneous ventilation  Hydration status: euvolemic  Follow-up not needed.  Comments:   Post op visit on Mother Baby following RONALDO or SAB for Obstetrical Anesthesia/Analgesia.  Block resolved.Pt ambulating. No complaints of headache.              Vitals Value Taken Time   /78 01/17/24 0756   Temp 36.8 °C (98.2 °F) 01/17/24 0756   Pulse 88 01/17/24 0756   Resp 18 01/17/24 0835   SpO2 96 % 01/16/24 2338   Vitals shown include unvalidated device data.      No case tracking events are documented in the log.      Pain/Steve Score: Pain Rating Prior to Med Admin: 8 (1/17/2024  8:35 AM)

## 2024-01-18 ENCOUNTER — ANESTHESIA (OUTPATIENT)
Dept: OBSTETRICS AND GYNECOLOGY | Facility: HOSPITAL | Age: 29
End: 2024-01-18
Payer: COMMERCIAL

## 2024-01-18 ENCOUNTER — ANESTHESIA EVENT (OUTPATIENT)
Dept: OBSTETRICS AND GYNECOLOGY | Facility: HOSPITAL | Age: 29
End: 2024-01-18
Payer: COMMERCIAL

## 2024-01-18 ENCOUNTER — LACTATION ENCOUNTER (OUTPATIENT)
Dept: OBSTETRICS AND GYNECOLOGY | Facility: HOSPITAL | Age: 29
End: 2024-01-18

## 2024-01-18 VITALS
OXYGEN SATURATION: 97 % | HEART RATE: 83 BPM | SYSTOLIC BLOOD PRESSURE: 124 MMHG | DIASTOLIC BLOOD PRESSURE: 85 MMHG | TEMPERATURE: 98 F | RESPIRATION RATE: 18 BRPM

## 2024-01-18 PROCEDURE — 25000003 PHARM REV CODE 250: Performed by: OBSTETRICS & GYNECOLOGY

## 2024-01-18 RX ORDER — OXYCODONE AND ACETAMINOPHEN 5; 325 MG/1; MG/1
1 TABLET ORAL EVERY 4 HOURS PRN
Qty: 10 TABLET | Refills: 0 | Status: SHIPPED | OUTPATIENT
Start: 2024-01-18

## 2024-01-18 RX ADMIN — IBUPROFEN 600 MG: 600 TABLET, FILM COATED ORAL at 04:01

## 2024-01-18 RX ADMIN — PRENATAL VITAMINS-IRON FUMARATE 27 MG IRON-FOLIC ACID 0.8 MG TABLET 1 TABLET: at 09:01

## 2024-01-18 RX ADMIN — DOCUSATE SODIUM 200 MG: 100 CAPSULE, LIQUID FILLED ORAL at 09:01

## 2024-01-18 RX ADMIN — IBUPROFEN 600 MG: 600 TABLET, FILM COATED ORAL at 09:01

## 2024-01-18 RX ADMIN — HYDROCODONE BITARTRATE AND ACETAMINOPHEN 1 TABLET: 10; 325 TABLET ORAL at 09:01

## 2024-01-18 RX ADMIN — HYDROCODONE BITARTRATE AND ACETAMINOPHEN 1 TABLET: 5; 325 TABLET ORAL at 12:01

## 2024-01-18 NOTE — ANESTHESIA POSTPROCEDURE EVALUATION
Anesthesia Post Evaluation    Patient: Corinne Haley    Procedure(s) Performed: * No surgery found *    Final Anesthesia Type: epidural      Patient location during evaluation: floor  Patient participation: Yes- Able to Participate  Level of consciousness: awake and alert and oriented  Post-procedure vital signs: reviewed and stable  Pain management: adequate  Airway patency: patent    PONV status at discharge: No PONV  Anesthetic complications: no      Cardiovascular status: blood pressure returned to baseline  Respiratory status: unassisted, spontaneous ventilation and room air  Hydration status: euvolemic  Follow-up not needed.  Comments: Denies headache, mod-severe backpain, or lower extremity motor weekness              Vitals Value Taken Time   /80 01/18/24 0808   Temp 36.9 °C (98.4 °F) 01/18/24 0808   Pulse 90 01/18/24 0808   Resp 18 01/18/24 0808   SpO2 96 % 01/16/24 2338   Vitals shown include unvalidated device data.      Cannot find surgical log.      Pain/Steve Score: Pain Rating Prior to Med Admin: 2 (1/18/2024  4:47 AM)  Pain Rating Post Med Admin: 4 (1/18/2024 12:03 AM)

## 2024-01-18 NOTE — DISCHARGE SUMMARY
"Delivery Discharge Summary  Obstetrics      Primary OB Clinician: Cruz Hernandez MD    Discharge Provider: Cruz Hernandez MD    Admission date: 1/15/2024  Discharge date: 2024    Admit Dx:  Term pregnancy     Discharge Dx:    Patient Active Problem List   Diagnosis    Gestational diabetes mellitus (GDM) in third trimester controlled on oral hypoglycemic drug    Poor fetal growth affecting management of mother in third trimester    (BMI 25.0-29.9) earlier in pregnancy    Leiomyoma of uterus affecting pregnancy in third trimester    Elevated blood pressure affecting pregnancy in third trimester, antepartum       Procedure: vaginal delivery    Hospital Course:  Corinne Haley is a 28 y.o. now  who was admitted on 1/15/2024 for delivery. Patient delivered a viable  via vaginal delivery. Please see delivery note for further details. Pt was in stable condition post delivery and was transferred to the Mother-Baby Unit. Her postpartum course was uncomplicated. On the date of discharge, patient's pain is controlled with oral pain medications. She is tolerating ambulation without SOB or CP, and PO diet without N/V. Reported lochia is within the normal range. Pt in stable condition and ready for discharge.     Pertinent studies:  Postpartum CBC  Lab Results   Component Value Date    WBC 9.61 01/15/2024    HGB 12.7 01/15/2024    HCT 40.1 01/15/2024    MCV 86.8 01/15/2024     01/15/2024       Delivery:    Episiotomy: Median   Lacerations: 2nd   Repair suture:     Repair # of packets: 1   Blood loss (ml):       Birth information:  YOB: 2026   Time of birth: 1:03 AM   Sex: male   Delivery type: Vaginal, Spontaneous   Gestational Age: 37w6d     Measurements    Weight: 2580 g  Weight (lbs): 5 lb 11 oz  Length: 47.6 cm  Length (in): 18.75"  Head circumference: 31.8 cm         Delivery Clinician: Delivery Providers    Delivering clinician: Cruz Hernandez MD   Provider Role    Jade Finnegan, " RN Registered Nurse    Faye Ruff RN Registered Nurse             Additional  information:  Forceps:    Vacuum:    Breech:    Observed anomalies      Living?:     Apgars    Living status: Living  Apgar Component Scores:  1 min.:  5 min.:  10 min.:  15 min.:  20 min.:    Skin color:  0  1       Heart rate:  2  2       Reflex irritability:  2  2       Muscle tone:  2  2       Respiratory effort:  1  2       Total:  7  9       Apgars assigned by: LESLIE RUFF RN         Placenta: Delivered:       appearance    Disposition: To home, self care    Follow Up: 6 weeks    Patient Instructions:   1. Call the office for any bleeding >2 pads/hour for >2 hours, temperature >100.4, pain that is uncontrolled with medications, or for any other concerns.  2. Pelvic rest and no tub baths x 6 weeks.

## 2024-01-18 NOTE — PROGRESS NOTES
PostPartum Progress Note        Subjective:      Postpartum Day #1 after uncomplicated vaginal delivery  .  Patient is without complaints. Lochia decreasing, less than menses.  Pain is well controlled. Patient is ambulating. Tolerating regular diet.Overall mother and baby are doing well.     Objective:      Temp:  [97.6 °F (36.4 °C)-98.2 °F (36.8 °C)] 97.6 °F (36.4 °C)  Pulse:  [80-91] 91  Resp:  [18] 18  BP: (119-128)/(76-84) 128/82  No intake or output data in the 24 hours ending 24 0804  There is no height or weight on file to calculate BMI.    General: no acute distress  Abdomen: soft, non-tender, non-distended; Fundus firm and below the umbilicus  Extremities: non-tender, symmetric, mild edema    Group & Rh   Date Value Ref Range Status   01/15/2024 O POS  Final   08/15/2023 O POS  Final     Recent Results (from the past 336 hour(s))   CBC with Differential    Collection Time: 01/15/24  3:22 PM   Result Value Ref Range    WBC 9.61 4.50 - 11.50 x10(3)/mcL    Hgb 12.7 12.0 - 16.0 g/dL    Hct 40.1 37.0 - 47.0 %    Platelet 341 130 - 400 x10(3)/mcL   CBC with Differential    Collection Time: 24  5:14 PM   Result Value Ref Range    WBC 8.73 4.50 - 11.50 x10(3)/mcL    Hgb 12.2 12.0 - 16.0 g/dL    Hct 37.5 37.0 - 47.0 %    Platelet 344 130 - 400 x10(3)/mcL          Assessment:     28 y.o.  S/P , Post-Partum Day # 1 - Doing Well      Plan:     1. Continue routine postpartum care  2. Plan for D/C tomorrow.

## 2024-01-18 NOTE — PLAN OF CARE
Problem: Adult Inpatient Plan of Care  Goal: Plan of Care Review  Outcome: Met  Goal: Patient-Specific Goal (Individualized)  Outcome: Met  Goal: Absence of Hospital-Acquired Illness or Injury  Outcome: Met  Goal: Optimal Comfort and Wellbeing  Outcome: Met  Goal: Readiness for Transition of Care  Outcome: Met     Problem: Adjustment to Role Transition (Postpartum Vaginal Delivery)  Goal: Successful Maternal Role Transition  Outcome: Met     Problem: Bleeding (Postpartum Vaginal Delivery)  Goal: Hemostasis  Outcome: Met     Problem: Infection (Postpartum Vaginal Delivery)  Goal: Absence of Infection Signs/Symptoms  Outcome: Met     Problem: Pain (Postpartum Vaginal Delivery)  Goal: Acceptable Pain Control  Outcome: Met     Problem: Urinary Retention (Postpartum Vaginal Delivery)  Goal: Effective Urinary Elimination  Outcome: Met     Problem: Breastfeeding  Goal: Effective Breastfeeding  Outcome: Met

## 2024-01-18 NOTE — LACTATION NOTE
"This note was copied from a baby's chart.  Mom says they are having trouble with latching. Mom has been pumping at each feeding and giving baby the expressed milk.   Left nipple inverts. Mom is using nipple shield. Assisted with position and latch on both sides. Good latch achieved with shield on left and without shield on right. Tips on deep latch reviewed. Baby is eager to latch, sucks a few times then falls asleep. Mom was assisted with pumping and expressed 6mls, which dad fed to baby.    Encouraged mom to  continue to express her milk after each feeding attempt, at least every 3 hours and feed the milk to baby. Discussed average feeding volume based on day of life. Mom is working on getting a pump for home use. Told mom about rental pump available in gift shop. Mom verbalized feeling comfortable with using manual pump and hand expressing.    AT 1620 mom and baby are going home. Reviewed discharge instructions,  milk storage guidelines and warming.  Verbalized understanding of all.      [facility]        [phone]  Discharge Instructions    Watch for early feeding cues (rooting, hand to mouth, smacking lips, sticking out tongue). Offer the breast at the first signs of hunger. Crying is a late sign of hunger; don't wait until then.    Feed your baby at least 8-12 times in a 24-hour period. Feeding early and often will ensure a plentiful milk supply for you and your baby and will prevent engorgement in the coming days.  Do not limit or schedule feedings.    "Cluster feeding" is normal; baby may nurse very often for several times in a row. This commonly occurs in the evening or early part of the night.    Allow your baby to finish one side before offering the other. You can try to burp the baby and then offer the other breast if he/ she seems to still be hungry.     Skin to skin contact helps a sleepy baby want to nurse. Babies who are frequently held skin to skin nurse better and longer. Skin to skin " increases mom's milk-making hormone levels as well. Skin to skin can help calm baby too.     By the end of the first week, you want to see 6-8 wet diapers per day and 3-5 yellow, seedy stools (stools will change from black to green to yellow by the end of the 1st week. Refer to chart in breastfeeding booklet to see how many wet/ dirty diapers baby should be having each day. Notify pediatrician if baby is not having enough wet and dirty diapers.    It is best to avoid bottles and pacifiers for the first 4 weeks while getting breastfeeding established.     Back to work or school: 4 weeks is a good time to start pumping after morning feeds in order to store milk for baby, although you may pump before if needed. Around 4-6 weeks is a good time to introduce a bottle of pumped milk to baby if you will go back to work or school.     You should feel a tugging or pulling sensation when your baby nurses; it should never feel sharp, pinching, or singing. If there is pain, try to adjust the latch. Make sure your baby opens his mouth wide to latch on. His lips should be flanged out, like a fish. (You may want to refer to the handouts in your packet or view latch videos at [employer] or [employer].    Listen for swallowing. This indicates your baby is transferring that milk!     Your milk will increase between days 3-5. Frequent feeds can help with engorgement.     If your breasts begin to get engorged, place warm cloths on them or  a warm shower before feeding. This will help the milk begin to flow. Feed often to drain the breasts. After feeding, you may use cold packs for 10-15 minutes to reduce swelling. You may also want to pump for comfort; don't overdo it- just pump enough to relieve the fullness.     No soap or lotions to the nipples except for medical grade lanolin or nipple cream for soreness.     All babies go through growth spurts. The first one is generally around 2-3 weeks. If your baby  starts to nurse a lot more than usual, this is likely the reason. Growth spurts happen every so often and usually last for 3-5 days.     Remember to check the safety of any medications, prescription or non-prescription (including herbals), before you take them. Your baby's pediatrician is the best one to confirm the safety of the medication while you are breastfeeding. You may also phone us. We can tell you about safety ratings that have been published regarding a particular medication. You may wish to phone the [facility] at [phone] to check the safety of a medication.     Call with any questions or concerns. Don't wait-- ask for help early. Breastfeeding Resources can be found on the last few pages of your Breastfeeding Booklet given to you in the hospital.

## 2024-01-19 ENCOUNTER — PATIENT OUTREACH (OUTPATIENT)
Dept: ADMINISTRATIVE | Facility: CLINIC | Age: 29
End: 2024-01-19
Payer: COMMERCIAL

## 2024-01-21 ENCOUNTER — PATIENT MESSAGE (OUTPATIENT)
Dept: ADMINISTRATIVE | Facility: OTHER | Age: 29
End: 2024-01-21
Payer: COMMERCIAL

## 2024-04-15 PROBLEM — O36.5930 POOR FETAL GROWTH AFFECTING MANAGEMENT OF MOTHER IN THIRD TRIMESTER: Status: RESOLVED | Noted: 2024-01-04 | Resolved: 2024-04-15

## 2024-11-06 ENCOUNTER — PATIENT MESSAGE (OUTPATIENT)
Dept: URGENT CARE | Facility: CLINIC | Age: 29
End: 2024-11-06
Payer: COMMERCIAL

## 2025-05-08 NOTE — PLAN OF CARE
"  Problem: Adult Inpatient Plan of Care  Goal: Plan of Care Review  2024 by Rowena Gonzalez RN  Outcome: Ongoing, Progressing  2024 by Rowena Gonzalez RN  Outcome: Ongoing, Progressing  2024 by Rowena Gonzalez RN  Outcome: Ongoing, Progressing  Goal: Patient-Specific Goal (Individualized)  2024 by Rowena Gonzalez RN  Outcome: Ongoing, Progressing  Flowsheets (Taken 2024)  Individualized Care Needs: "i want to breastfeed my baby"  2024 by Rowena Gonzalez RN  Outcome: Ongoing, Progressing  2024 by Rowena Gonzalez RN  Outcome: Ongoing, Progressing  Goal: Absence of Hospital-Acquired Illness or Injury  2024 by Rowena Gonzalez RN  Outcome: Ongoing, Progressing  2024 by Rowena Gonzalez RN  Outcome: Ongoing, Progressing  2024 by Rowena Gonzalez RN  Outcome: Ongoing, Progressing  Goal: Optimal Comfort and Wellbeing  2024 by Rowena Gonzalez RN  Outcome: Ongoing, Progressing  2024 by Rowena Gonzalez RN  Outcome: Ongoing, Progressing  2024 by Rowena Gonzalez RN  Outcome: Ongoing, Progressing  Goal: Readiness for Transition of Care  2024 by Rowena Gonzalez RN  Outcome: Ongoing, Progressing  2024 by Rowena Gonzalez RN  Outcome: Ongoing, Progressing  2024 by Rowena Gonzalez RN  Outcome: Ongoing, Progressing     Problem: Infection  Goal: Absence of Infection Signs and Symptoms  2024 by Rowena Gonzalez RN  Outcome: Ongoing, Progressing  2024 by Rowena Gonzalez RN  Outcome: Ongoing, Progressing  2024 by Rowena Gonzalez RN  Outcome: Ongoing, Progressing     Problem:  Fall Injury Risk  Goal: Absence of Fall, Infant Drop and Related Injury  2024 by Rowena Gonzalez RN  Outcome: Ongoing, Progressing  2024 043 by Rowena Gonzalez, RN  Outcome: Ongoing, Progressing  2024 043 by Rowena Gonzalez, RN  Outcome: Ongoing, " Progressing     Problem: Adjustment to Role Transition (Postpartum Vaginal Delivery)  Goal: Successful Maternal Role Transition  Outcome: Ongoing, Progressing     Problem: Bleeding (Postpartum Vaginal Delivery)  Goal: Hemostasis  Outcome: Ongoing, Progressing     Problem: Infection (Postpartum Vaginal Delivery)  Goal: Absence of Infection Signs/Symptoms  Outcome: Ongoing, Progressing     Problem: Pain (Postpartum Vaginal Delivery)  Goal: Acceptable Pain Control  Outcome: Ongoing, Progressing     Problem: Urinary Retention (Postpartum Vaginal Delivery)  Goal: Effective Urinary Elimination  Outcome: Ongoing, Progressing      Yes